# Patient Record
Sex: MALE | Race: WHITE | Employment: OTHER | URBAN - METROPOLITAN AREA
[De-identification: names, ages, dates, MRNs, and addresses within clinical notes are randomized per-mention and may not be internally consistent; named-entity substitution may affect disease eponyms.]

---

## 2017-11-13 ENCOUNTER — OFFICE VISIT (OUTPATIENT)
Dept: CARDIOLOGY | Age: 62
End: 2017-11-13
Payer: COMMERCIAL

## 2017-11-13 VITALS
DIASTOLIC BLOOD PRESSURE: 98 MMHG | HEART RATE: 73 BPM | WEIGHT: 200 LBS | SYSTOLIC BLOOD PRESSURE: 148 MMHG | OXYGEN SATURATION: 98 % | BODY MASS INDEX: 30.31 KG/M2 | HEIGHT: 68 IN | RESPIRATION RATE: 18 BRPM

## 2017-11-13 DIAGNOSIS — I48.20 CHRONIC ATRIAL FIBRILLATION (HCC): ICD-10-CM

## 2017-11-13 DIAGNOSIS — I42.8 NON-ISCHEMIC CARDIOMYOPATHY (HCC): ICD-10-CM

## 2017-11-13 DIAGNOSIS — I50.42 CHRONIC COMBINED SYSTOLIC AND DIASTOLIC CHF, NYHA CLASS 2 (HCC): Primary | ICD-10-CM

## 2017-11-13 DIAGNOSIS — I10 ESSENTIAL HYPERTENSION: ICD-10-CM

## 2017-11-13 PROCEDURE — 99214 OFFICE O/P EST MOD 30 MIN: CPT | Performed by: FAMILY MEDICINE

## 2017-11-13 PROCEDURE — 93000 ELECTROCARDIOGRAM COMPLETE: CPT | Performed by: FAMILY MEDICINE

## 2017-11-13 NOTE — PROGRESS NOTES
Patient: Darío Coombs  : 1955  Date of Visit: 2017    REASON FOR VISIT / CONSULTATION: 1 Year Follow Up (EKG done today. HX: Cardiomyopathy, Afib, CHF. Pt wt at last visit was 189 todays wt is 200. Pt states he is doing real good. Denies: CP, Palpitations, lightheaded/dizziness, SOB. )      Dear Ozzy Madden MD      I had the pleasure of seeing your patient Darío Coombs in consultation today. As you know, Mr. Rachael Salazar is a 58 y.o. male with a history of what sounds to be a non-ischemic cardiomyopathy back in  most likely secondary to a rate related cardiomyopathy due to atrial fibrillation. At that time his EF was apparently \"severely reduced\" but he is unsure of whether his heart ever got stronger or not. His EF in  was 35-40% on echocardiography. Since the last time I saw him,  Mr. Rachael Salazar reports having a pretty good exercise tolerance and denies any significant limitations in his activity. He denied any current or recent chest pain, abdominal pain, bleeding problems, problems with his medications or any other concerns at this time. Past Medical History:   Diagnosis Date    Atrial fibrillation ([address] 2008    Cardiomyopathy [facility]     Congestive heart failure, unspecified     Hyperlipidemia        CURRENT ALLERGIES: Nutritional supplements and Bee venom REVIEW OF SYSTEMS: 10 systems were reviewed. Pertinent positives and negatives as above, all else negative. Past Surgical History:   Procedure Laterality Date    ANKLE SURGERY      right ankle.  plate applied    COLONOSCOPY      TONSILLECTOMY      Social History:  Social History   Substance Use Topics    Smoking status: Former Smoker     Quit date: 1/10/1993    Smokeless tobacco: Never Used    Alcohol use No        CURRENT MEDICATIONS:  Outpatient Prescriptions Marked as Taking for the 17 encounter (Office Visit) with Mary Swenson MD   Medication Sig Dispense Refill    sacubitril-valsartan again, thank you for allowing me to participate in this patients care. Please do not hesitate to contact me could I be of further assistance. Sincerely,  Jovi Price. Judith LEOS, MS, F.A.C.C.   [facility] Cardiology Specialist  [address] [facility], [address] [place] [address]  Phone: [phone], Fax: [phone]

## 2017-11-18 ENCOUNTER — HOSPITAL ENCOUNTER (OUTPATIENT)
Age: 62
Discharge: HOME | End: 2017-11-18
Payer: COMMERCIAL

## 2017-11-18 DIAGNOSIS — I10 ESSENTIAL HYPERTENSION: ICD-10-CM

## 2017-11-18 DIAGNOSIS — I48.20 CHRONIC ATRIAL FIBRILLATION (HCC): ICD-10-CM

## 2017-11-18 LAB
ANION GAP SERPL CALCULATED.3IONS-SCNC: 13 MMOL/L (ref 9–17)
BUN BLDV-MCNC: 18 MG/DL (ref 8–23)
BUN/CREAT BLD: 19 (ref 9–20)
CALCIUM SERPL-MCNC: 9.3 MG/DL (ref 8.6–10.4)
CHLORIDE BLD-SCNC: 101 MMOL/L (ref 98–107)
CO2: 26 MMOL/L (ref 20–31)
CREAT SERPL-MCNC: 0.97 MG/DL (ref 0.7–1.2)
GFR AFRICAN AMERICAN: >60 ML/MIN
GFR NON-AFRICAN AMERICAN: >60 ML/MIN
GFR SERPL CREATININE-BSD FRML MDRD: ABNORMAL ML/MIN/{1.73_M2}
GFR SERPL CREATININE-BSD FRML MDRD: ABNORMAL ML/MIN/{1.73_M2}
GLUCOSE BLD-MCNC: 127 MG/DL (ref 70–99)
HCT VFR BLD CALC: 47.4 % (ref 41–53)
HEMOGLOBIN: 15.7 G/DL (ref 13.5–17)
MCH RBC QN AUTO: 28.8 PG (ref 26–34)
MCHC RBC AUTO-ENTMCNC: 33.1 G/DL (ref 31–37)
MCV RBC AUTO: 87.2 FL (ref 80–100)
PDW BLD-RTO: 13.5 % (ref 12.1–15.2)
PLATELET # BLD: 207 K/UL (ref 140–450)
PMV BLD AUTO: 10.2 FL (ref 6–12)
POTASSIUM SERPL-SCNC: 4 MMOL/L (ref 3.7–5.3)
RBC # BLD: 5.44 M/UL (ref 4.5–5.9)
SODIUM BLD-SCNC: 140 MMOL/L (ref 135–144)
WBC # BLD: 6.7 K/UL (ref 3.5–11)

## 2017-11-18 PROCEDURE — 80048 BASIC METABOLIC PNL TOTAL CA: CPT

## 2017-11-18 PROCEDURE — 85027 COMPLETE CBC AUTOMATED: CPT

## 2017-11-18 PROCEDURE — 36415 COLL VENOUS BLD VENIPUNCTURE: CPT

## 2018-04-09 ENCOUNTER — HOSPITAL ENCOUNTER (OUTPATIENT)
Age: 63
Discharge: HOME | End: 2018-04-09
Payer: COMMERCIAL

## 2018-04-09 DIAGNOSIS — Z12.5 ENCOUNTER FOR PROSTATE CANCER SCREENING: ICD-10-CM

## 2018-04-09 LAB — PROSTATE SPECIFIC ANTIGEN: 0.69 UG/L

## 2018-04-09 PROCEDURE — 36415 COLL VENOUS BLD VENIPUNCTURE: CPT

## 2018-04-09 PROCEDURE — G0103 PSA SCREENING: HCPCS

## 2018-04-17 ENCOUNTER — OFFICE VISIT (OUTPATIENT)
Dept: CARDIOLOGY | Age: 63
End: 2018-04-17
Payer: COMMERCIAL

## 2018-04-17 VITALS
RESPIRATION RATE: 18 BRPM | DIASTOLIC BLOOD PRESSURE: 95 MMHG | BODY MASS INDEX: 29.62 KG/M2 | OXYGEN SATURATION: 98 % | HEIGHT: 69 IN | SYSTOLIC BLOOD PRESSURE: 136 MMHG | HEART RATE: 78 BPM | WEIGHT: 200 LBS

## 2018-04-17 DIAGNOSIS — I48.20 CHRONIC ATRIAL FIBRILLATION (HCC): ICD-10-CM

## 2018-04-17 DIAGNOSIS — I42.9 IDIOPATHIC CARDIOMYOPATHY (HCC): ICD-10-CM

## 2018-04-17 DIAGNOSIS — I10 ESSENTIAL HYPERTENSION: ICD-10-CM

## 2018-04-17 DIAGNOSIS — E78.5 HYPERLIPIDEMIA, UNSPECIFIED HYPERLIPIDEMIA TYPE: ICD-10-CM

## 2018-04-17 DIAGNOSIS — I50.9 CONGESTIVE HEART FAILURE, UNSPECIFIED CONGESTIVE HEART FAILURE CHRONICITY, UNSPECIFIED CONGESTIVE HEART FAILURE TYPE: Primary | ICD-10-CM

## 2018-04-17 PROCEDURE — 99214 OFFICE O/P EST MOD 30 MIN: CPT | Performed by: FAMILY MEDICINE

## 2018-04-26 ENCOUNTER — HOSPITAL ENCOUNTER (OUTPATIENT)
Dept: NON INVASIVE DIAGNOSTICS | Age: 63
Discharge: HOME | End: 2018-04-26
Payer: COMMERCIAL

## 2018-04-26 DIAGNOSIS — I10 ESSENTIAL HYPERTENSION: ICD-10-CM

## 2018-04-26 DIAGNOSIS — I50.9 CONGESTIVE HEART FAILURE, UNSPECIFIED CONGESTIVE HEART FAILURE CHRONICITY, UNSPECIFIED CONGESTIVE HEART FAILURE TYPE: ICD-10-CM

## 2018-04-26 DIAGNOSIS — I48.20 CHRONIC ATRIAL FIBRILLATION (HCC): ICD-10-CM

## 2018-04-26 DIAGNOSIS — E78.5 HYPERLIPIDEMIA, UNSPECIFIED HYPERLIPIDEMIA TYPE: ICD-10-CM

## 2018-04-26 LAB
LV EF: 55 %
LVEF MODALITY: NORMAL

## 2018-04-26 PROCEDURE — 93306 TTE W/DOPPLER COMPLETE: CPT

## 2018-04-27 ENCOUNTER — TELEPHONE (OUTPATIENT)
Dept: CARDIOLOGY | Age: 63
End: 2018-04-27

## 2018-07-09 ENCOUNTER — HOSPITAL ENCOUNTER (OUTPATIENT)
Age: 63
Setting detail: SPECIMEN
Discharge: HOME | End: 2018-07-09
Payer: COMMERCIAL

## 2018-07-09 PROCEDURE — 86403 PARTICLE AGGLUT ANTBDY SCRN: CPT

## 2018-07-09 PROCEDURE — 87186 SC STD MICRODIL/AGAR DIL: CPT

## 2018-07-09 PROCEDURE — 87205 SMEAR GRAM STAIN: CPT

## 2018-07-09 PROCEDURE — 87070 CULTURE OTHR SPECIMN AEROBIC: CPT

## 2018-07-11 LAB
CULTURE: ABNORMAL
DIRECT EXAM: ABNORMAL
DIRECT EXAM: ABNORMAL
Lab: ABNORMAL
ORGANISM: ABNORMAL
SPECIMEN DESCRIPTION: ABNORMAL
STATUS: ABNORMAL

## 2018-07-17 ENCOUNTER — HOSPITAL ENCOUNTER (OUTPATIENT)
Dept: GENERAL RADIOLOGY | Age: 63
Discharge: HOME | End: 2018-07-19
Payer: COMMERCIAL

## 2018-07-17 ENCOUNTER — HOSPITAL ENCOUNTER (EMERGENCY)
Age: 63
Discharge: HOME | End: 2018-07-17
Attending: FAMILY MEDICINE
Payer: COMMERCIAL

## 2018-07-17 ENCOUNTER — HOSPITAL ENCOUNTER (OUTPATIENT)
Age: 63
Discharge: HOME | End: 2018-07-19
Payer: COMMERCIAL

## 2018-07-17 VITALS
SYSTOLIC BLOOD PRESSURE: 149 MMHG | OXYGEN SATURATION: 97 % | RESPIRATION RATE: 20 BRPM | HEART RATE: 78 BPM | DIASTOLIC BLOOD PRESSURE: 88 MMHG

## 2018-07-17 DIAGNOSIS — Z95.828 S/P PICC CENTRAL LINE PLACEMENT: Primary | ICD-10-CM

## 2018-07-17 DIAGNOSIS — Z45.2 PICC (PERIPHERALLY INSERTED CENTRAL CATHETER) FLUSH: ICD-10-CM

## 2018-07-17 DIAGNOSIS — Z79.899 MEDICATION THERAPY CONTINUED: ICD-10-CM

## 2018-07-17 PROCEDURE — 2580000003 HC RX 258: Performed by: FAMILY MEDICINE

## 2018-07-17 PROCEDURE — 71046 X-RAY EXAM CHEST 2 VIEWS: CPT

## 2018-07-17 PROCEDURE — 6360000002 HC RX W HCPCS: Performed by: FAMILY MEDICINE

## 2018-07-17 PROCEDURE — 99283 EMERGENCY DEPT VISIT LOW MDM: CPT

## 2018-07-17 PROCEDURE — 96374 THER/PROPH/DIAG INJ IV PUSH: CPT

## 2018-07-17 RX ORDER — CEFAZOLIN 1 G/1
2 INJECTION, POWDER, FOR SOLUTION INTRAVENOUS EVERY 8 HOURS
COMMUNITY
End: 2018-10-24 | Stop reason: ALTCHOICE

## 2018-07-17 RX ADMIN — WATER 2 G: 1 INJECTION INTRAMUSCULAR; INTRAVENOUS; SUBCUTANEOUS at 19:24

## 2018-07-17 NOTE — ED PROVIDER NOTES
tablet, R-11Normal             ALLERGIES     is allergic to nutritional supplements and bee venom. FAMILY HISTORY     indicated that his mother is alive. He indicated that his father is . He indicated that his sister is alive. He indicated that his brother is alive. family history includes Heart Disease in his mother; High Blood Pressure in his mother and sister; Other in his brother; Stroke in his mother. SOCIAL HISTORY      reports that he quit smoking about 25 years ago. He has never used smokeless tobacco. He reports that he does not drink alcohol or use drugs. PHYSICAL EXAM     INITIAL VITALS:  blood pressure is 149/88 (abnormal) and his pulse is 78. His respiration is 20 and oxygen saturation is 97%. Physical Exam   Constitutional: Patient is oriented to person, place, and time. Patient appears well-developed and well-nourished. Patient is active and cooperative. Neck: Full passive range of motion without pain and phonation normal.   Cardiovascular:  Normal rate, regular rhythm and intact distal pulses. PICC line noted right upper extremity  Pulses: Right radial pulse  2+   Pulmonary/Chest: Effort normal. No tachypnea and no bradypnea. Abdominal: Soft. Patient without distension   Musculoskeletal:   Negative acute trauma or deformity,  apparent full range of motion and normal strength all extremities appropriate to age. Neurological: Patient is alert and oriented to person, place, and time. patient displays no tremor. Patient displays no seizure activity. .     Skin: Skin is warm and dry. Patient is not diaphoretic. Psychiatric: Patient has a normal mood and pleasant affect.  Patient speech is normal and behavior is normal. Cognition and memory are normal.      DIFFERENTIAL DIAGNOSIS:   PICC line failure    DIAGNOSTIC RESULTS           RADIOLOGY: non-plain film images(s) such as CT, Ultrasound and MRI are read by the radiologist.  No orders to display       LABS:   Labs Reviewed - No data to display    EMERGENCY DEPARTMENT COURSE:   Vitals:    Vitals:    07/17/18 1912 07/17/18 1945   BP: (!) 150/105 (!) 149/88   Pulse: 78    Resp: 20    SpO2: 97%      RN Jose M Jamil had contacted Dr. Vince Angulo from [place], [place], regarding patient's PICC line difficulties,, physician at that facility does advise they will arranged at patient's PICC line evaluated, however does ask if we can give the patient his cefazolin IV dose in the emergency room    Discussed the patient nursing contact with his physician at [address] [place] [address], discussed plan for dose of cefazolin 2g IV  at this facility, patient go to [address] [place] [address] 5 next day for new PICC line, acknowledges            FINAL IMPRESSION      1. S/P PICC central line placement    2. Medication therapy continued          DISPOSITION/PLAN   Discharge    PATIENT REFERRED TO:  [address], MD  [address] [place] [address]  [phone]    Call   As needed      DISCHARGE MEDICATIONS:  Discharge Medication List as of 7/17/2018  7:37 PM              Summation      Patient Course:  Discharge    ED Medications administered this visit:    Medications   ceFAZolin (ANCEF) 2 g in sterile water 20 mL IV syringe (0 g Intravenous Stop Time 7/17/18 1935)       New Prescriptions from this visit:    Discharge Medication List as of 7/17/2018  7:37 PM          Follow-up:  [address], MD  [address] [place] [address]  [phone]    Call   As needed        Final Impression:   1. S/P PICC central line placement    2.  Medication therapy continued               (Please note that portions of this note were completed with a voice recognition program.  Efforts were made to edit the dictations but occasionally words are mis-transcribed.)    MD Brian Freeman MD  07/18/18 [id number]

## 2018-07-17 NOTE — ED NOTES
Dr. Anastacia Freitas called back and ask if patient could go to [facility] emergency department at 10 am and the PICC team will be able to replace his PICC. Patient verbalized understanding. Dr. Anastacia Freitas notified patient agreeable to be there.      Dano Mcnamara RN  07/17/18 3927

## 2018-07-23 ENCOUNTER — HOSPITAL ENCOUNTER (OUTPATIENT)
Age: 63
Setting detail: SPECIMEN
Discharge: HOME | End: 2018-07-23
Payer: COMMERCIAL

## 2018-07-23 LAB
ANION GAP SERPL CALCULATED.3IONS-SCNC: 11 MMOL/L (ref 9–17)
BUN BLDV-MCNC: 15 MG/DL (ref 8–23)
BUN/CREAT BLD: 18 (ref 9–20)
C-REACTIVE PROTEIN: 2 MG/L (ref 0–5)
CALCIUM SERPL-MCNC: 9.4 MG/DL (ref 8.6–10.4)
CHLORIDE BLD-SCNC: 100 MMOL/L (ref 98–107)
CO2: 29 MMOL/L (ref 20–31)
CREAT SERPL-MCNC: 0.82 MG/DL (ref 0.7–1.2)
GFR AFRICAN AMERICAN: >60 ML/MIN
GFR NON-AFRICAN AMERICAN: >60 ML/MIN
GFR SERPL CREATININE-BSD FRML MDRD: NORMAL ML/MIN/{1.73_M2}
GFR SERPL CREATININE-BSD FRML MDRD: NORMAL ML/MIN/{1.73_M2}
GLUCOSE BLD-MCNC: 94 MG/DL (ref 70–99)
HCT VFR BLD CALC: 40.2 % (ref 40.7–50.3)
HEMOGLOBIN: 13.6 G/DL (ref 13–17)
MCH RBC QN AUTO: 29.4 PG (ref 25.2–33.5)
MCHC RBC AUTO-ENTMCNC: 33.8 G/DL (ref 28.4–34.8)
MCV RBC AUTO: 87 FL (ref 82.6–102.9)
NRBC AUTOMATED: 0 PER 100 WBC
PDW BLD-RTO: 12.4 % (ref 11.8–14.4)
PLATELET # BLD: 177 K/UL (ref 138–453)
PMV BLD AUTO: 11.5 FL (ref 8.1–13.5)
POTASSIUM SERPL-SCNC: 4.2 MMOL/L (ref 3.7–5.3)
RBC # BLD: 4.62 M/UL (ref 4.21–5.77)
SEDIMENTATION RATE, ERYTHROCYTE: 5 MM (ref 0–20)
SODIUM BLD-SCNC: 140 MMOL/L (ref 135–144)
WBC # BLD: 5.5 K/UL (ref 3.5–11.3)

## 2018-07-23 PROCEDURE — 85651 RBC SED RATE NONAUTOMATED: CPT

## 2018-07-23 PROCEDURE — 86140 C-REACTIVE PROTEIN: CPT

## 2018-07-23 PROCEDURE — 85027 COMPLETE CBC AUTOMATED: CPT

## 2018-07-23 PROCEDURE — 80048 BASIC METABOLIC PNL TOTAL CA: CPT

## 2018-07-30 ENCOUNTER — HOSPITAL ENCOUNTER (OUTPATIENT)
Age: 63
Setting detail: SPECIMEN
Discharge: HOME | End: 2018-07-30
Payer: COMMERCIAL

## 2018-07-30 LAB
ANION GAP SERPL CALCULATED.3IONS-SCNC: 11 MMOL/L (ref 9–17)
BUN BLDV-MCNC: 14 MG/DL (ref 8–23)
BUN/CREAT BLD: 19 (ref 9–20)
C-REACTIVE PROTEIN: 2.7 MG/L (ref 0–5)
CALCIUM SERPL-MCNC: 9.1 MG/DL (ref 8.6–10.4)
CHLORIDE BLD-SCNC: 99 MMOL/L (ref 98–107)
CO2: 29 MMOL/L (ref 20–31)
CREAT SERPL-MCNC: 0.73 MG/DL (ref 0.7–1.2)
GFR AFRICAN AMERICAN: >60 ML/MIN
GFR NON-AFRICAN AMERICAN: >60 ML/MIN
GFR SERPL CREATININE-BSD FRML MDRD: ABNORMAL ML/MIN/{1.73_M2}
GFR SERPL CREATININE-BSD FRML MDRD: ABNORMAL ML/MIN/{1.73_M2}
GLUCOSE BLD-MCNC: 107 MG/DL (ref 70–99)
HCT VFR BLD CALC: 38.3 % (ref 40.7–50.3)
HEMOGLOBIN: 12.9 G/DL (ref 13–17)
MCH RBC QN AUTO: 29.3 PG (ref 25.2–33.5)
MCHC RBC AUTO-ENTMCNC: 33.7 G/DL (ref 28.4–34.8)
MCV RBC AUTO: 87 FL (ref 82.6–102.9)
NRBC AUTOMATED: 0 PER 100 WBC
PDW BLD-RTO: 12.3 % (ref 11.8–14.4)
PLATELET # BLD: 163 K/UL (ref 138–453)
PMV BLD AUTO: 11.6 FL (ref 8.1–13.5)
POTASSIUM SERPL-SCNC: 4.1 MMOL/L (ref 3.7–5.3)
RBC # BLD: 4.4 M/UL (ref 4.21–5.77)
SEDIMENTATION RATE, ERYTHROCYTE: 7 MM (ref 0–20)
SODIUM BLD-SCNC: 139 MMOL/L (ref 135–144)
WBC # BLD: 4.8 K/UL (ref 3.5–11.3)

## 2018-07-30 PROCEDURE — 85027 COMPLETE CBC AUTOMATED: CPT

## 2018-07-30 PROCEDURE — 86140 C-REACTIVE PROTEIN: CPT

## 2018-07-30 PROCEDURE — 85651 RBC SED RATE NONAUTOMATED: CPT

## 2018-07-30 PROCEDURE — 80048 BASIC METABOLIC PNL TOTAL CA: CPT

## 2018-08-06 ENCOUNTER — HOSPITAL ENCOUNTER (OUTPATIENT)
Age: 63
Setting detail: SPECIMEN
Discharge: HOME | End: 2018-08-06
Payer: COMMERCIAL

## 2018-08-06 LAB
ANION GAP SERPL CALCULATED.3IONS-SCNC: 8 MMOL/L (ref 9–17)
BUN BLDV-MCNC: 17 MG/DL (ref 8–23)
BUN/CREAT BLD: 22 (ref 9–20)
C-REACTIVE PROTEIN: 1.1 MG/L (ref 0–5)
CALCIUM SERPL-MCNC: 9.4 MG/DL (ref 8.6–10.4)
CHLORIDE BLD-SCNC: 98 MMOL/L (ref 98–107)
CO2: 31 MMOL/L (ref 20–31)
CREAT SERPL-MCNC: 0.77 MG/DL (ref 0.7–1.2)
GFR AFRICAN AMERICAN: >60 ML/MIN
GFR NON-AFRICAN AMERICAN: >60 ML/MIN
GFR SERPL CREATININE-BSD FRML MDRD: ABNORMAL ML/MIN/{1.73_M2}
GFR SERPL CREATININE-BSD FRML MDRD: ABNORMAL ML/MIN/{1.73_M2}
GLUCOSE BLD-MCNC: 103 MG/DL (ref 70–99)
HCT VFR BLD CALC: 41.1 % (ref 40.7–50.3)
HEMOGLOBIN: 13.8 G/DL (ref 13–17)
MCH RBC QN AUTO: 29.1 PG (ref 25.2–33.5)
MCHC RBC AUTO-ENTMCNC: 33.6 G/DL (ref 28.4–34.8)
MCV RBC AUTO: 86.5 FL (ref 82.6–102.9)
NRBC AUTOMATED: 0 PER 100 WBC
PDW BLD-RTO: 12.2 % (ref 11.8–14.4)
PLATELET # BLD: 189 K/UL (ref 138–453)
PMV BLD AUTO: 11.4 FL (ref 8.1–13.5)
POTASSIUM SERPL-SCNC: 4.2 MMOL/L (ref 3.7–5.3)
RBC # BLD: 4.75 M/UL (ref 4.21–5.77)
SEDIMENTATION RATE, ERYTHROCYTE: 6 MM (ref 0–20)
SODIUM BLD-SCNC: 137 MMOL/L (ref 135–144)
WBC # BLD: 5.1 K/UL (ref 3.5–11.3)

## 2018-08-06 PROCEDURE — 85027 COMPLETE CBC AUTOMATED: CPT

## 2018-08-06 PROCEDURE — 86140 C-REACTIVE PROTEIN: CPT

## 2018-08-06 PROCEDURE — 80048 BASIC METABOLIC PNL TOTAL CA: CPT

## 2018-08-06 PROCEDURE — 85651 RBC SED RATE NONAUTOMATED: CPT

## 2018-08-13 ENCOUNTER — HOSPITAL ENCOUNTER (OUTPATIENT)
Age: 63
Setting detail: SPECIMEN
Discharge: HOME | End: 2018-08-13
Payer: COMMERCIAL

## 2018-08-13 LAB
ANION GAP SERPL CALCULATED.3IONS-SCNC: 10 MMOL/L (ref 9–17)
BUN BLDV-MCNC: 17 MG/DL (ref 8–23)
BUN/CREAT BLD: 20 (ref 9–20)
C-REACTIVE PROTEIN: 1.1 MG/L (ref 0–5)
CALCIUM SERPL-MCNC: 9.3 MG/DL (ref 8.6–10.4)
CHLORIDE BLD-SCNC: 102 MMOL/L (ref 98–107)
CO2: 29 MMOL/L (ref 20–31)
CREAT SERPL-MCNC: 0.87 MG/DL (ref 0.7–1.2)
GFR AFRICAN AMERICAN: >60 ML/MIN
GFR NON-AFRICAN AMERICAN: >60 ML/MIN
GFR SERPL CREATININE-BSD FRML MDRD: NORMAL ML/MIN/{1.73_M2}
GFR SERPL CREATININE-BSD FRML MDRD: NORMAL ML/MIN/{1.73_M2}
GLUCOSE BLD-MCNC: 99 MG/DL (ref 70–99)
HCT VFR BLD CALC: 40.7 % (ref 40.7–50.3)
HEMOGLOBIN: 13.6 G/DL (ref 13–17)
MCH RBC QN AUTO: 29 PG (ref 25.2–33.5)
MCHC RBC AUTO-ENTMCNC: 33.4 G/DL (ref 28.4–34.8)
MCV RBC AUTO: 86.8 FL (ref 82.6–102.9)
NRBC AUTOMATED: 0 PER 100 WBC
PDW BLD-RTO: 12.4 % (ref 11.8–14.4)
PLATELET # BLD: 163 K/UL (ref 138–453)
PMV BLD AUTO: 11.8 FL (ref 8.1–13.5)
POTASSIUM SERPL-SCNC: 4.1 MMOL/L (ref 3.7–5.3)
RBC # BLD: 4.69 M/UL (ref 4.21–5.77)
SEDIMENTATION RATE, ERYTHROCYTE: 6 MM (ref 0–20)
SODIUM BLD-SCNC: 141 MMOL/L (ref 135–144)
WBC # BLD: 5.1 K/UL (ref 3.5–11.3)

## 2018-08-13 PROCEDURE — 85027 COMPLETE CBC AUTOMATED: CPT

## 2018-08-13 PROCEDURE — 80048 BASIC METABOLIC PNL TOTAL CA: CPT

## 2018-08-13 PROCEDURE — 85651 RBC SED RATE NONAUTOMATED: CPT

## 2018-08-13 PROCEDURE — 86140 C-REACTIVE PROTEIN: CPT

## 2018-08-20 ENCOUNTER — HOSPITAL ENCOUNTER (OUTPATIENT)
Age: 63
Setting detail: SPECIMEN
Discharge: HOME | End: 2018-08-20
Payer: COMMERCIAL

## 2018-08-20 LAB
ABSOLUTE EOS #: 0.17 K/UL (ref 0–0.44)
ABSOLUTE IMMATURE GRANULOCYTE: <0.03 K/UL (ref 0–0.3)
ABSOLUTE LYMPH #: 1.18 K/UL (ref 1.1–3.7)
ABSOLUTE MONO #: 0.52 K/UL (ref 0.1–1.2)
ANION GAP SERPL CALCULATED.3IONS-SCNC: 11 MMOL/L (ref 9–17)
BASOPHILS # BLD: 1 % (ref 0–2)
BASOPHILS ABSOLUTE: 0.05 K/UL (ref 0–0.2)
BUN BLDV-MCNC: 15 MG/DL (ref 8–23)
BUN/CREAT BLD: 17 (ref 9–20)
C-REACTIVE PROTEIN: 1.1 MG/L (ref 0–5)
CALCIUM SERPL-MCNC: 9.4 MG/DL (ref 8.6–10.4)
CHLORIDE BLD-SCNC: 101 MMOL/L (ref 98–107)
CO2: 30 MMOL/L (ref 20–31)
CREAT SERPL-MCNC: 0.88 MG/DL (ref 0.7–1.2)
DIFFERENTIAL TYPE: ABNORMAL
EOSINOPHILS RELATIVE PERCENT: 3 % (ref 1–4)
GFR AFRICAN AMERICAN: >60 ML/MIN
GFR NON-AFRICAN AMERICAN: >60 ML/MIN
GFR SERPL CREATININE-BSD FRML MDRD: ABNORMAL ML/MIN/{1.73_M2}
GFR SERPL CREATININE-BSD FRML MDRD: ABNORMAL ML/MIN/{1.73_M2}
GLUCOSE BLD-MCNC: 108 MG/DL (ref 70–99)
HCT VFR BLD CALC: 41 % (ref 40.7–50.3)
HEMOGLOBIN: 13.8 G/DL (ref 13–17)
IMMATURE GRANULOCYTES: 0 %
LYMPHOCYTES # BLD: 23 % (ref 24–43)
MCH RBC QN AUTO: 29.2 PG (ref 25.2–33.5)
MCHC RBC AUTO-ENTMCNC: 33.7 G/DL (ref 28.4–34.8)
MCV RBC AUTO: 86.7 FL (ref 82.6–102.9)
MONOCYTES # BLD: 10 % (ref 3–12)
NRBC AUTOMATED: 0 PER 100 WBC
PDW BLD-RTO: 12.3 % (ref 11.8–14.4)
PLATELET # BLD: 165 K/UL (ref 138–453)
PLATELET ESTIMATE: ABNORMAL
PMV BLD AUTO: 11.7 FL (ref 8.1–13.5)
POTASSIUM SERPL-SCNC: 4.5 MMOL/L (ref 3.7–5.3)
RBC # BLD: 4.73 M/UL (ref 4.21–5.77)
RBC # BLD: ABNORMAL 10*6/UL
SEDIMENTATION RATE, ERYTHROCYTE: 2 MM (ref 0–20)
SEG NEUTROPHILS: 63 % (ref 36–65)
SEGMENTED NEUTROPHILS ABSOLUTE COUNT: 3.16 K/UL (ref 1.5–8.1)
SODIUM BLD-SCNC: 142 MMOL/L (ref 135–144)
WBC # BLD: 5.1 K/UL (ref 3.5–11.3)
WBC # BLD: ABNORMAL 10*3/UL

## 2018-08-20 PROCEDURE — 85025 COMPLETE CBC W/AUTO DIFF WBC: CPT

## 2018-08-20 PROCEDURE — 80048 BASIC METABOLIC PNL TOTAL CA: CPT

## 2018-08-20 PROCEDURE — 85651 RBC SED RATE NONAUTOMATED: CPT

## 2018-08-20 PROCEDURE — 86140 C-REACTIVE PROTEIN: CPT

## 2018-10-24 ENCOUNTER — OFFICE VISIT (OUTPATIENT)
Dept: CARDIOLOGY | Age: 63
End: 2018-10-24
Payer: COMMERCIAL

## 2018-10-24 VITALS
SYSTOLIC BLOOD PRESSURE: 138 MMHG | BODY MASS INDEX: 29.77 KG/M2 | OXYGEN SATURATION: 98 % | RESPIRATION RATE: 16 BRPM | HEART RATE: 96 BPM | DIASTOLIC BLOOD PRESSURE: 89 MMHG | HEIGHT: 69 IN | WEIGHT: 201 LBS

## 2018-10-24 DIAGNOSIS — I10 ESSENTIAL HYPERTENSION: ICD-10-CM

## 2018-10-24 DIAGNOSIS — I48.20 CHRONIC A-FIB (HCC): ICD-10-CM

## 2018-10-24 DIAGNOSIS — E78.2 MIXED HYPERLIPIDEMIA: ICD-10-CM

## 2018-10-24 DIAGNOSIS — I50.42 CHRONIC COMBINED SYSTOLIC AND DIASTOLIC CHF, NYHA CLASS 2 (HCC): Primary | ICD-10-CM

## 2018-10-24 PROCEDURE — 93000 ELECTROCARDIOGRAM COMPLETE: CPT | Performed by: FAMILY MEDICINE

## 2018-10-24 PROCEDURE — 99214 OFFICE O/P EST MOD 30 MIN: CPT | Performed by: FAMILY MEDICINE

## 2018-10-24 RX ORDER — ATORVASTATIN CALCIUM 40 MG/1
40 TABLET, FILM COATED ORAL DAILY
Qty: 90 TABLET | Refills: 3 | Status: SHIPPED | OUTPATIENT
Start: 2018-10-24 | End: 2019-10-15 | Stop reason: SDUPTHER

## 2018-10-24 NOTE — PROGRESS NOTES
rubs. A I/VI systolic murmur was heard maximally at the 2nd right intercostal space. Pulmonary/Chest: Effort normal and breath sounds normal. No respiratory distress. He has no wheezes, rhonchi or rales. Abdominal: Soft, non-tender. Bowel sounds and aorta are normal. He exhibits no organomegaly, mass or bruit. Extremities:  No edema, cyanosis, or clubbing. Pulses are 2+ radial/carotid/dorsalis pedis and posterior tibial bilaterally. Neurological: He is alert and oriented to person, place, and time. No evidence of gross cranial nerve deficit. Coordination appeared normal.   Skin: Skin is warm and dry. There is no rash or diaphoresis. Psychiatric: He has a normal mood and affect. His speech is normal and behavior is normal.      MOST RECENT LABS ON RECORD:   Lab Results   Component Value Date    WBC 5.1 08/20/2018    HGB 13.8 08/20/2018    HCT 41.0 08/20/2018     08/20/2018    CHOL 144 06/03/2016    TRIG 83 06/03/2016    HDL 33 (L) 06/03/2016    ALT 19 06/03/2016    AST 17 06/03/2016     08/20/2018    K 4.5 08/20/2018     08/20/2018    CREATININE 0.88 08/20/2018    BUN 15 08/20/2018    CO2 30 08/20/2018    TSH 1.12 04/24/2015    PSA 0.69 04/09/2018    INR 1.2 01/10/2013       ASSESSMENT:  1. Chronic combined systolic and diastolic CHF, NYHA class 2 ([facility]    2. Chronic a-fib ([facility] [address]    3. Essential hypertension    4. Mixed hyperlipidemia       PLAN:  Chronic Systolic and Diastolic Heart Failure: EF improved from 35% up to 55% on 4/26/2018 via echocardiogram  · Beta Blocker Therapy: Continue Carvedilol 25 mg twice daily. ACE Inibitor/ARB: Continue sacubitril/valsartan (Entresto) 49/51 mg every 12 hours    ·  Nonpharmacologic management of Heart Failure:  I advised him to try and keep his legs up whenever possible and to limit salt in his diet.          · Chronic Atrial Fibrillation: Rate Control  · Beta Blocker Therapy: Continue Carvedilol     · His CHADS2 score is greater than 2(2.2% stroke

## 2018-11-23 ENCOUNTER — HOSPITAL ENCOUNTER (OUTPATIENT)
Age: 63
Discharge: HOME | End: 2018-11-23
Payer: COMMERCIAL

## 2018-11-23 DIAGNOSIS — I48.20 CHRONIC A-FIB (HCC): ICD-10-CM

## 2018-11-23 DIAGNOSIS — I50.42 CHRONIC COMBINED SYSTOLIC AND DIASTOLIC CHF, NYHA CLASS 2 (HCC): ICD-10-CM

## 2018-11-23 DIAGNOSIS — I10 ESSENTIAL HYPERTENSION: ICD-10-CM

## 2018-11-23 DIAGNOSIS — E78.2 MIXED HYPERLIPIDEMIA: ICD-10-CM

## 2018-11-23 LAB
CHOLESTEROL/HDL RATIO: 3.7
CHOLESTEROL: 114 MG/DL
HDLC SERPL-MCNC: 31 MG/DL
LDL CHOLESTEROL: 63 MG/DL (ref 0–130)
TRIGL SERPL-MCNC: 102 MG/DL
VLDLC SERPL CALC-MCNC: ABNORMAL MG/DL (ref 1–30)

## 2018-11-23 PROCEDURE — 80061 LIPID PANEL: CPT

## 2018-11-23 PROCEDURE — 36415 COLL VENOUS BLD VENIPUNCTURE: CPT

## 2018-11-26 ENCOUNTER — TELEPHONE (OUTPATIENT)
Dept: CARDIOLOGY | Age: 63
End: 2018-11-26

## 2019-10-15 DIAGNOSIS — I50.42 CHRONIC COMBINED SYSTOLIC AND DIASTOLIC CHF, NYHA CLASS 2 (HCC): ICD-10-CM

## 2019-10-15 DIAGNOSIS — I48.20 CHRONIC A-FIB (HCC): ICD-10-CM

## 2019-10-15 DIAGNOSIS — I10 ESSENTIAL HYPERTENSION: ICD-10-CM

## 2019-10-15 DIAGNOSIS — E78.2 MIXED HYPERLIPIDEMIA: ICD-10-CM

## 2019-10-15 RX ORDER — ATORVASTATIN CALCIUM 40 MG/1
40 TABLET, FILM COATED ORAL DAILY
Qty: 90 TABLET | Refills: 3 | Status: SHIPPED | OUTPATIENT
Start: 2019-10-15 | End: 2019-10-28 | Stop reason: SDUPTHER

## 2019-10-28 ENCOUNTER — OFFICE VISIT (OUTPATIENT)
Dept: CARDIOLOGY | Age: 64
End: 2019-10-28

## 2019-10-28 VITALS
HEART RATE: 95 BPM | SYSTOLIC BLOOD PRESSURE: 118 MMHG | HEIGHT: 69 IN | DIASTOLIC BLOOD PRESSURE: 80 MMHG | BODY MASS INDEX: 30.21 KG/M2 | RESPIRATION RATE: 18 BRPM | WEIGHT: 204 LBS | OXYGEN SATURATION: 96 %

## 2019-10-28 DIAGNOSIS — I10 ESSENTIAL HYPERTENSION: ICD-10-CM

## 2019-10-28 DIAGNOSIS — I50.42 CHRONIC COMBINED SYSTOLIC AND DIASTOLIC CHF, NYHA CLASS 2 (HCC): Primary | ICD-10-CM

## 2019-10-28 DIAGNOSIS — I48.20 CHRONIC ATRIAL FIBRILLATION (HCC): ICD-10-CM

## 2019-10-28 DIAGNOSIS — I48.20 CHRONIC A-FIB (HCC): ICD-10-CM

## 2019-10-28 DIAGNOSIS — I42.9 IDIOPATHIC CARDIOMYOPATHY (HCC): ICD-10-CM

## 2019-10-28 DIAGNOSIS — E78.2 MIXED HYPERLIPIDEMIA: ICD-10-CM

## 2019-10-28 PROCEDURE — 99213 OFFICE O/P EST LOW 20 MIN: CPT | Performed by: FAMILY MEDICINE

## 2019-10-28 PROCEDURE — 93000 ELECTROCARDIOGRAM COMPLETE: CPT | Performed by: FAMILY MEDICINE

## 2019-10-28 RX ORDER — ATORVASTATIN CALCIUM 40 MG/1
40 TABLET, FILM COATED ORAL DAILY
Qty: 90 TABLET | Refills: 3 | Status: SHIPPED | OUTPATIENT
Start: 2019-10-28 | End: 2020-07-30 | Stop reason: SDUPTHER

## 2019-12-09 ENCOUNTER — HOSPITAL ENCOUNTER (OUTPATIENT)
Age: 64
Discharge: HOME | End: 2019-12-09
Payer: COMMERCIAL

## 2019-12-09 DIAGNOSIS — I10 ESSENTIAL HYPERTENSION: ICD-10-CM

## 2019-12-09 DIAGNOSIS — Z12.5 ENCOUNTER FOR PROSTATE CANCER SCREENING: ICD-10-CM

## 2019-12-10 ENCOUNTER — HOSPITAL ENCOUNTER (OUTPATIENT)
Age: 64
Discharge: HOME | End: 2019-12-10
Payer: COMMERCIAL

## 2019-12-10 DIAGNOSIS — I10 ESSENTIAL HYPERTENSION: ICD-10-CM

## 2019-12-10 DIAGNOSIS — Z12.5 SPECIAL SCREENING FOR MALIGNANT NEOPLASM OF PROSTATE: ICD-10-CM

## 2019-12-10 DIAGNOSIS — E78.2 MIXED HYPERLIPIDEMIA: ICD-10-CM

## 2019-12-10 LAB
ABSOLUTE EOS #: 0.21 K/UL (ref 0–0.44)
ABSOLUTE IMMATURE GRANULOCYTE: <0.03 K/UL (ref 0–0.3)
ABSOLUTE LYMPH #: 1.63 K/UL (ref 1.1–3.7)
ABSOLUTE MONO #: 0.51 K/UL (ref 0.1–1.2)
ALBUMIN SERPL-MCNC: 4.4 G/DL (ref 3.5–5.2)
ALBUMIN/GLOBULIN RATIO: 1.7 (ref 1–2.5)
ALP BLD-CCNC: 101 U/L (ref 40–129)
ALT SERPL-CCNC: 20 U/L (ref 5–41)
ANION GAP SERPL CALCULATED.3IONS-SCNC: 8 MMOL/L (ref 9–17)
AST SERPL-CCNC: 16 U/L
BASOPHILS # BLD: 1 % (ref 0–2)
BASOPHILS ABSOLUTE: 0.07 K/UL (ref 0–0.2)
BILIRUB SERPL-MCNC: 0.79 MG/DL (ref 0.3–1.2)
BUN BLDV-MCNC: 14 MG/DL (ref 8–23)
BUN/CREAT BLD: 14 (ref 9–20)
CALCIUM SERPL-MCNC: 9.4 MG/DL (ref 8.6–10.4)
CHLORIDE BLD-SCNC: 99 MMOL/L (ref 98–107)
CHOLESTEROL/HDL RATIO: 3.8
CHOLESTEROL: 127 MG/DL
CO2: 31 MMOL/L (ref 20–31)
CREAT SERPL-MCNC: 1.02 MG/DL (ref 0.7–1.2)
DIFFERENTIAL TYPE: NORMAL
EOSINOPHILS RELATIVE PERCENT: 3 % (ref 1–4)
GFR AFRICAN AMERICAN: >60 ML/MIN
GFR NON-AFRICAN AMERICAN: >60 ML/MIN
GFR SERPL CREATININE-BSD FRML MDRD: ABNORMAL ML/MIN/{1.73_M2}
GFR SERPL CREATININE-BSD FRML MDRD: ABNORMAL ML/MIN/{1.73_M2}
GLUCOSE BLD-MCNC: 113 MG/DL (ref 70–99)
HCT VFR BLD CALC: 46.6 % (ref 40.7–50.3)
HDLC SERPL-MCNC: 33 MG/DL
HEMOGLOBIN: 15.1 G/DL (ref 13–17)
IMMATURE GRANULOCYTES: 0 %
LDL CHOLESTEROL: 72 MG/DL (ref 0–130)
LYMPHOCYTES # BLD: 26 % (ref 24–43)
MCH RBC QN AUTO: 28.8 PG (ref 25.2–33.5)
MCHC RBC AUTO-ENTMCNC: 32.4 G/DL (ref 28.4–34.8)
MCV RBC AUTO: 88.9 FL (ref 82.6–102.9)
MONOCYTES # BLD: 8 % (ref 3–12)
NRBC AUTOMATED: 0 PER 100 WBC
PDW BLD-RTO: 12.5 % (ref 11.8–14.4)
PLATELET # BLD: 175 K/UL (ref 138–453)
PLATELET ESTIMATE: NORMAL
PMV BLD AUTO: 11.5 FL (ref 8.1–13.5)
POTASSIUM SERPL-SCNC: 4.2 MMOL/L (ref 3.7–5.3)
PROSTATE SPECIFIC ANTIGEN: 0.78 UG/L
RBC # BLD: 5.24 M/UL (ref 4.21–5.77)
RBC # BLD: NORMAL 10*6/UL
SEG NEUTROPHILS: 62 % (ref 36–65)
SEGMENTED NEUTROPHILS ABSOLUTE COUNT: 3.75 K/UL (ref 1.5–8.1)
SODIUM BLD-SCNC: 138 MMOL/L (ref 135–144)
TOTAL PROTEIN: 7 G/DL (ref 6.4–8.3)
TRIGL SERPL-MCNC: 109 MG/DL
VLDLC SERPL CALC-MCNC: ABNORMAL MG/DL (ref 1–30)
WBC # BLD: 6.2 K/UL (ref 3.5–11.3)
WBC # BLD: NORMAL 10*3/UL

## 2019-12-10 PROCEDURE — 36415 COLL VENOUS BLD VENIPUNCTURE: CPT

## 2019-12-10 PROCEDURE — 85025 COMPLETE CBC W/AUTO DIFF WBC: CPT

## 2019-12-10 PROCEDURE — G0103 PSA SCREENING: HCPCS

## 2019-12-10 PROCEDURE — 80053 COMPREHEN METABOLIC PANEL: CPT

## 2019-12-10 PROCEDURE — 80061 LIPID PANEL: CPT

## 2020-10-22 ENCOUNTER — OFFICE VISIT (OUTPATIENT)
Dept: CARDIOLOGY | Age: 65
End: 2020-10-22
Payer: MEDICARE

## 2020-10-22 VITALS
HEIGHT: 69 IN | RESPIRATION RATE: 18 BRPM | OXYGEN SATURATION: 96 % | BODY MASS INDEX: 30.07 KG/M2 | DIASTOLIC BLOOD PRESSURE: 71 MMHG | SYSTOLIC BLOOD PRESSURE: 120 MMHG | HEART RATE: 93 BPM | WEIGHT: 203 LBS

## 2020-10-22 PROCEDURE — 99214 OFFICE O/P EST MOD 30 MIN: CPT | Performed by: FAMILY MEDICINE

## 2020-10-22 PROCEDURE — 99211 OFF/OP EST MAY X REQ PHY/QHP: CPT | Performed by: FAMILY MEDICINE

## 2020-10-22 PROCEDURE — 93010 ELECTROCARDIOGRAM REPORT: CPT | Performed by: FAMILY MEDICINE

## 2020-10-22 PROCEDURE — 93005 ELECTROCARDIOGRAM TRACING: CPT | Performed by: FAMILY MEDICINE

## 2020-10-22 NOTE — PROGRESS NOTES
Jimmie Hammond am scribing for and in the presence of Trena Wong MD, MS, F.A.C.C. Patient: Isabela Torres  : 1955  Date of Visit: 2020    REASON FOR VISIT / CONSULTATION: 1 Year Follow Up (HX: CHF, Idiopathic Cardiomyopathy, Chronic A-Fib, HTN, HLD. Pt states he is doing good. Denies: CP, Palpitations, Lighteaded/dizziness, SOB. )      Dear Shayla Hair MD,     I had the pleasure of seeing your patient Isabela Torres in consultation today. As you know, Mr. Leandro Starr is a 72 y.o. male with a history of what sounds to be a non-ischemic cardiomyopathy back in  most likely secondary to a rate related cardiomyopathy due to atrial fibrillation. At that same time, he says he thinks he was diagnosed with blood clots in his lungs but denies any known issues or repeat problems since then. At that time his EF was apparently \"severely reduced\" but he is unsure of whether his heart ever got stronger or not. His EF in  was 35-40% on echocardiography. In , he was started on Entresto for his non ischemic cardiomyopathy. Echocardiogram done on 2018 showed an improved ejection fraction from 35-40% up to >55%. Since the last time I saw him, Mr. Leandro Starr reports doing very well with no complaints today. He denied any current or recent chest pain, abdominal pain, bleeding problems, problems with his medications or any other concerns at this time. Bleeding Risks: Mr. Leandro Starr denies any current or recent bleeding problems including a history of a GI bleed, ulcers, recent or upcoming surgeries, blood in his stool or black tarry stools or blood in his urine. Exercise Tolerance: Mr. Leandro Starr reports that he has an excellent exercise tolerance. His says that he could walk 1 mile without developing chest discomfort or significant shortness of breath.      Past Medical History:   Diagnosis Date    Atrial fibrillation ([address]     Cardiomyopathy [facility])     Congestive heart failure, unspecified     History of echocardiogram 2018    EF >55%. LV wall thickness is moderately increased. LA is moderately dilated (34-39) w/ LA volume index of 34. Mild mitral regurg. Mild tricuspid regurg. Diastology cannot be assessed to pt rhythm of what appears to be afib,    Hyperlipidemia        CURRENT ALLERGIES: Nutritional supplements and Bee venom REVIEW OF SYSTEMS: 14 systems were reviewed. Pertinent positives and negatives as above, all else negative. Past Surgical History:   Procedure Laterality Date    ANKLE SURGERY      right ankle. plate applied    COLONOSCOPY  2011    TONSILLECTOMY      Social History:  Social History     Tobacco Use    Smoking status: Former Smoker     Last attempt to quit: 1/10/1993     Years since quittin.8    Smokeless tobacco: Never Used   Substance Use Topics    Alcohol use: No    Drug use: No        CURRENT MEDICATIONS:  Outpatient Medications Marked as Taking for the 10/22/20 encounter (Office Visit) with Renee Quan MD   Medication Sig Dispense Refill    carvedilol (COREG) 25 MG tablet Take 1 tablet by mouth 2 times daily 180 tablet 0    atorvastatin (LIPITOR) 40 MG tablet Take 1 tablet by mouth daily 90 tablet 3    apixaban (ELIQUIS) 5 MG TABS tablet Take 1 tablet by mouth 2 times daily 180 tablet 0    sacubitril-valsartan (ENTRESTO) 49-51 MG per tablet Take 1 tablet by mouth 2 times daily 180 tablet 3       FAMILY HISTORY: family history includes Heart Disease in his mother; High Blood Pressure in his mother and sister; Other in his brother; Stroke in his mother. PHYSICAL EXAM:   /71 (Site: Left Upper Arm, Position: Sitting, Cuff Size: Medium Adult)   Pulse 93   Resp 18   Ht 5' 9\" (1.753 m)   Wt 203 lb (92.1 kg)   SpO2 96%   BMI 29.98 kg/m²  Body mass index is 29.98 kg/m². Constitutional: He is oriented to person, place, and time. He appears well-developed and well-nourished. In no acute distress.    HEENT: Normocephalic and atraumatic. No JVD present. Carotid bruit is not present. No mass and no thyromegaly present. No lymphadenopathy present. Cardiovascular: Normal rate, irregularly irregular rhythm, normal heart sounds. Exam reveals no gallop and no friction rubs. A II/VI systolic was heard maximally at the 2nd right intercostal space. Pulmonary/Chest: Effort normal and breath sounds normal. No respiratory distress. He has no wheezes, rhonchi or rales. Abdominal: Soft, non-tender. Bowel sounds and aorta are normal. He exhibits no organomegaly, mass or bruit. Extremities:  No edema, cyanosis, or clubbing. Pulses are 2+ radial/carotid/dorsalis pedis and posterior tibial bilaterally. Neurological: He is alert and oriented to person, place, and time. No evidence of gross cranial nerve deficit. Coordination appeared normal.   Skin: Skin is warm and dry. There is no rash or diaphoresis. Psychiatric: He has a normal mood and affect. His speech is normal and behavior is normal.      MOST RECENT LABS ON RECORD:   Lab Results   Component Value Date    WBC 6.2 12/10/2019    HGB 15.1 12/10/2019    HCT 46.6 12/10/2019     12/10/2019    CHOL 127 12/10/2019    TRIG 109 12/10/2019    HDL 33 (L) 12/10/2019    ALT 20 12/10/2019    AST 16 12/10/2019     12/10/2019    K 4.2 12/10/2019    CL 99 12/10/2019    CREATININE 1.02 12/10/2019    BUN 14 12/10/2019    CO2 31 12/10/2019    TSH 1.12 04/24/2015    PSA 0.78 12/10/2019    INR 1.2 01/10/2013    LABA1C 5.4 02/11/2020       ASSESSMENT:  1. Chronic diastolic congestive heart failure ([address]    2. Idiopathic cardiomyopathy ([address]    3. Chronic atrial fibrillation ([address]    4. Essential hypertension    5.  Mixed hyperlipidemia       PLAN:  · Chronic Diastolic Heart Failure/ History of a Non Ischemic Cardiomyopathy, EF improved from 35% up to 55% on 4/26/2018 via echocardiogram. Currently appears very well without active signs of heart failure  · Beta Blocker Therapy: Continue Carvedilol 25 mg continue to take his current medications and follow up with you as previously scheduled. FOLLOW UP:   I told Mr. Hansen to call my office if he had any problems, but otherwise told him to Return in about 1 year (around 10/22/2021). However, I would be happy to see him sooner should the need arise. Once again, thank you for allowing me to participate in this patients care. Sincerely,  Areli Cherry. Judith LEOS, MS, F.A.C.C. [facility] Cardiology Specialist  [address] [place] [address]  Phone: [phone], Fax: [phone]     I believe that the risk of significant morbidity and mortality related to the patient's current medical conditions are: low-intermediate. The documentation recorded by the scribe, accurately and completely reflects the services I personally performed and the decisions made by me. Abilio Wong MD, MS, F.A.C.C.  October 22, 2020

## 2020-10-22 NOTE — PATIENT INSTRUCTIONS
SURVEY:    You may be receiving a survey from [email or web address] regarding your visit today. Please complete the survey to enable us to provide the highest quality of care to you and your family. If you cannot score us a very good on any question, please call the office to discuss how we could have made your experience a very good one. Thank you. Rashad Méndez was your MA today!

## 2020-10-27 ENCOUNTER — HOSPITAL ENCOUNTER (OUTPATIENT)
Age: 65
Discharge: HOME | End: 2020-10-27
Payer: MEDICARE

## 2020-10-27 LAB
ALBUMIN SERPL-MCNC: 4.3 G/DL (ref 3.5–5.2)
ALBUMIN/GLOBULIN RATIO: 2.2 (ref 1–2.5)
ALP BLD-CCNC: 93 U/L (ref 40–129)
ALT SERPL-CCNC: 20 U/L (ref 5–41)
ANION GAP SERPL CALCULATED.3IONS-SCNC: 7 MMOL/L (ref 9–17)
AST SERPL-CCNC: 15 U/L
BILIRUB SERPL-MCNC: 0.82 MG/DL (ref 0.3–1.2)
BUN BLDV-MCNC: 16 MG/DL (ref 8–23)
BUN/CREAT BLD: 16 (ref 9–20)
CALCIUM SERPL-MCNC: 9.4 MG/DL (ref 8.6–10.4)
CHLORIDE BLD-SCNC: 101 MMOL/L (ref 98–107)
CO2: 30 MMOL/L (ref 20–31)
CREAT SERPL-MCNC: 0.97 MG/DL (ref 0.7–1.2)
GFR AFRICAN AMERICAN: >60 ML/MIN
GFR NON-AFRICAN AMERICAN: >60 ML/MIN
GFR SERPL CREATININE-BSD FRML MDRD: ABNORMAL ML/MIN/{1.73_M2}
GFR SERPL CREATININE-BSD FRML MDRD: ABNORMAL ML/MIN/{1.73_M2}
GLUCOSE BLD-MCNC: 110 MG/DL (ref 70–99)
HCT VFR BLD CALC: 42.7 % (ref 40.7–50.3)
HEMOGLOBIN: 14.1 G/DL (ref 13–17)
MCH RBC QN AUTO: 29.2 PG (ref 25.2–33.5)
MCHC RBC AUTO-ENTMCNC: 33 G/DL (ref 28.4–34.8)
MCV RBC AUTO: 88.4 FL (ref 82.6–102.9)
NRBC AUTOMATED: 0 PER 100 WBC
PDW BLD-RTO: 12.7 % (ref 11.8–14.4)
PLATELET # BLD: 171 K/UL (ref 138–453)
PMV BLD AUTO: 11.8 FL (ref 8.1–13.5)
POTASSIUM SERPL-SCNC: 4.1 MMOL/L (ref 3.7–5.3)
RBC # BLD: 4.83 M/UL (ref 4.21–5.77)
SODIUM BLD-SCNC: 138 MMOL/L (ref 135–144)
TOTAL PROTEIN: 6.3 G/DL (ref 6.4–8.3)
WBC # BLD: 7 K/UL (ref 3.5–11.3)

## 2020-10-27 PROCEDURE — 85027 COMPLETE CBC AUTOMATED: CPT

## 2020-10-27 PROCEDURE — 36415 COLL VENOUS BLD VENIPUNCTURE: CPT

## 2020-10-27 PROCEDURE — 80053 COMPREHEN METABOLIC PANEL: CPT

## 2020-10-28 ENCOUNTER — TELEPHONE (OUTPATIENT)
Dept: CARDIOLOGY | Age: 65
End: 2020-10-28

## 2020-10-28 NOTE — TELEPHONE ENCOUNTER
----- Message from Tiffany Kelly MD sent at 10/27/2020 11:18 PM EDT -----  Let Mr. Hansen know their test result was ok. Thanks.

## 2020-11-04 RX ORDER — SACUBITRIL AND VALSARTAN 49; 51 MG/1; MG/1
1 TABLET, FILM COATED ORAL 2 TIMES DAILY
Qty: 180 TABLET | Refills: 3 | Status: SHIPPED | OUTPATIENT
Start: 2020-11-04 | End: 2021-11-16 | Stop reason: SDUPTHER

## 2020-12-29 RX ORDER — ATORVASTATIN CALCIUM 40 MG/1
40 TABLET, FILM COATED ORAL DAILY
Qty: 90 TABLET | Refills: 3 | Status: SHIPPED | OUTPATIENT
Start: 2020-12-29 | End: 2022-01-06 | Stop reason: SDUPTHER

## 2021-02-05 ENCOUNTER — HOSPITAL ENCOUNTER (OUTPATIENT)
Dept: ULTRASOUND IMAGING | Age: 66
Discharge: HOME | End: 2021-02-07
Payer: MEDICARE

## 2021-02-05 ENCOUNTER — HOSPITAL ENCOUNTER (OUTPATIENT)
Age: 66
Discharge: HOME | End: 2021-02-05
Payer: MEDICARE

## 2021-02-05 DIAGNOSIS — Z00.00 ENCOUNTER FOR MEDICARE ANNUAL WELLNESS EXAM: ICD-10-CM

## 2021-02-05 DIAGNOSIS — Z13.6 SCREENING FOR AAA (AORTIC ABDOMINAL ANEURYSM): ICD-10-CM

## 2021-02-05 PROCEDURE — 80061 LIPID PANEL: CPT

## 2021-02-05 PROCEDURE — 76706 US ABDL AORTA SCREEN AAA: CPT

## 2021-02-05 PROCEDURE — 36415 COLL VENOUS BLD VENIPUNCTURE: CPT

## 2021-02-06 LAB
CHOLESTEROL/HDL RATIO: 3.6
CHOLESTEROL: 112 MG/DL
HDLC SERPL-MCNC: 31 MG/DL
LDL CHOLESTEROL: 63 MG/DL (ref 0–130)
TRIGL SERPL-MCNC: 89 MG/DL
VLDLC SERPL CALC-MCNC: ABNORMAL MG/DL (ref 1–30)

## 2021-03-09 ENCOUNTER — OFFICE VISIT (OUTPATIENT)
Dept: SURGERY | Age: 66
End: 2021-03-09
Payer: MEDICARE

## 2021-03-09 VITALS
RESPIRATION RATE: 18 BRPM | HEART RATE: 90 BPM | BODY MASS INDEX: 30.51 KG/M2 | TEMPERATURE: 97.7 F | HEIGHT: 69 IN | DIASTOLIC BLOOD PRESSURE: 83 MMHG | SYSTOLIC BLOOD PRESSURE: 120 MMHG | WEIGHT: 206 LBS

## 2021-03-09 DIAGNOSIS — Z01.818 PRE-OP TESTING: ICD-10-CM

## 2021-03-09 DIAGNOSIS — R19.5 POSITIVE FIT (FECAL IMMUNOCHEMICAL TEST): Primary | ICD-10-CM

## 2021-03-09 DIAGNOSIS — Z86.79 HISTORY OF ATRIAL FIBRILLATION: ICD-10-CM

## 2021-03-09 PROCEDURE — 99202 OFFICE O/P NEW SF 15 MIN: CPT | Performed by: SURGERY

## 2021-03-09 ASSESSMENT — ENCOUNTER SYMPTOMS
NAUSEA: 0
COUGH: 0
VOMITING: 0
TROUBLE SWALLOWING: 0
SHORTNESS OF BREATH: 0
BACK PAIN: 0
BLOOD IN STOOL: 0
ABDOMINAL PAIN: 0
SORE THROAT: 0
CHOKING: 0

## 2021-03-09 NOTE — PATIENT INSTRUCTIONS
Patient Education        Learning About Colonoscopy  What is a colonoscopy? A colonoscopy is a test (also called a procedure) that lets a doctor look inside your large intestine. The doctor uses a thin, lighted tube called a colonoscope. The doctor uses it to look for small growths called polyps, colon or rectal cancer (colorectal cancer), or other problems like bleeding. During the procedure, the doctor can take samples of tissue. The samples can then be checked for cancer or other conditions. The doctor can also take out polyps. How is a colonoscopy done? This procedure is done in a doctor's office or a clinic or hospital. You will get medicine to help you relax and not feel pain. Some people find that they don't remember having the test because of the medicine. The doctor gently moves the colonoscope, or scope, through the colon. The scope is also a small video camera. It lets the doctor see the colon and take pictures. How do you prepare for the procedure? You need to clean out your colon before the procedure so the doctor can see all of your colon. This process may start a day or two before the test. This depends on which \"colon prep\" your doctor recommends. To clean your colon, you stop eating solid foods and drink only clear liquids. You can have water, tea, coffee, clear juices, clear broths, flavored ice pops, and gelatin (such as Jell-O). Do not drink anything red or purple. The day or night before the procedure, you drink a large amount of a special liquid. This causes loose, frequent stools. You will go to the bathroom a lot. It's very important to drink all of the liquid. If you have problems drinking it, call your doctor. Some people don't go to work or do their usual activities on the day of the prep. Arrange to have someone take you home after the test.  What can you expect after a colonoscopy? Your doctor will tell you when you can eat and do your usual activities.   Drink a lot of fluid after the test to replace the fluids you may have lost during the colon prep. But don't drink alcohol. Your doctor will talk to you about when you'll need your next colonoscopy. The results of your test and your risk for colorectal cancer will help your doctor decide how often you need to be checked. After the test, you may be bloated or have gas pains. You may need to pass gas. If a biopsy was done or a polyp was removed, you may have streaks of blood in your stool (feces) for a few days. If polyps were taken out, your doctor may tell you to avoid taking aspirin and nonsteroidal anti-inflammatory drugs (NSAIDs) for 7 to 14 days. Problems such as heavy rectal bleeding may not occur until several weeks after the test. This isn't common. But it can happen after polyps are removed. Follow-up care is a key part of your treatment and safety. Be sure to make and go to all appointments, and call your doctor if you are having problems. It's also a good idea to know your test results and keep a list of the medicines you take. Where can you learn more? Go to https://[employer]. org and sign in to your [employer] account. Enter G867 in the [employer] box to learn more about \"Learning About Colonoscopy. \"     If you do not have an account, please click on the \"Sign Up Now\" link. Current as of: April 29, 2020               Content Version: 12.6  © 8325-5625 [employer]. Care instructions adapted under license by [facility] ([facility]). If you have questions about a medical condition or this instruction, always ask your healthcare professional. [address] any warranty or liability for your use of this information.

## 2021-03-09 NOTE — PROGRESS NOTES
Olya Cueto MD  General Surgery, Endoscopy  Chief Medical Officer    [id number] [address]  Dept: [phone]  Fax: 15 Keturah Atkins  Chief Complaint   Patient presents with    New Patient    Colonoscopy     Patient referred by Dr Enrique Mccullough for colonoscopy consult due to positive fecal immunochemical test. Patient denies any symptoms. Previous colonoscopy 2011. No known history of COVID-19. Received first dose of vaccination, second is scheduled. HPI    Mr. Preston Smith is a pleasant 51-year-old white male kindly referred to me by Dr. Enrique Mccullough evaluation of positive FIT. He has no GI complaints. No abdominal pain. No epigastric pain nor reflux symptoms. Normal daily bowel movements, formed and brown, no visible blood. Normal appetite. No recent weight changes, 206 pounds, BMI 30. Colonoscopy proximately 10 years ago was normal, without polyps, as patient recalls. No cough, fever nor other respiratory symptoms. No history of COVID-19, received first vaccination, booster scheduled. History of A. fib and cardiomyopathy with CHF, currently taking Eliquis. Annual visits with Dr. Huffman Person, most recently 5 months ago. No previous abdominal surgery. No family history of colon cancer nor colon polyps. Patient quit tobacco 1993. Review of Systems   Constitutional: Negative for activity change, appetite change, chills, fever and unexpected weight change. HENT: Negative for nosebleeds, sneezing, sore throat and trouble swallowing. Eyes: Negative for visual disturbance. Respiratory: Negative for cough, choking and shortness of breath. Cardiovascular: Negative for chest pain, palpitations and leg swelling. Gastrointestinal: Negative for abdominal pain, blood in stool, nausea and vomiting. Genitourinary: Negative for dysuria, flank pain and hematuria. Musculoskeletal: Positive for arthralgias.  Negative for back pain, gait problem and myalgias. Allergic/Immunologic: Negative for immunocompromised state. Neurological: Negative for dizziness, seizures, syncope, weakness and headaches. Hematological: Does not bruise/bleed easily. Psychiatric/Behavioral: Negative for confusion and sleep disturbance. Past Medical History:   Diagnosis Date    Atrial fibrillation ([address] 2008    Cardiomyopathy [facility])     Congestive heart failure, unspecified     History of echocardiogram 04/26/2018    EF >55%. LV wall thickness is moderately increased. LA is moderately dilated (34-39) w/ LA volume index of 34. Mild mitral regurg. Mild tricuspid regurg. Diastology cannot be assessed to pt rhythm of what appears to be afib,    Hyperlipidemia        Past Surgical History:   Procedure Laterality Date    ANKLE SURGERY      right ankle. plate applied    COLONOSCOPY  2011    TONSILLECTOMY         Family History   Problem Relation Age of Onset    High Blood Pressure Mother     Heart Disease Mother     Stroke Mother     High Blood Pressure Sister     Other Brother         valve replacement in heart        Allergies:  See list    Current Outpatient Medications   Medication Sig Dispense Refill    apixaban (ELIQUIS) 5 MG TABS tablet Take 1 tablet by mouth 2 times daily 180 tablet 0    carvedilol (COREG) 25 MG tablet Take 1 tablet by mouth 2 times daily 180 tablet 0    atorvastatin (LIPITOR) 40 MG tablet Take 1 tablet by mouth daily 90 tablet 3    sacubitril-valsartan (ENTRESTO) 49-51 MG per tablet Take 1 tablet by mouth 2 times daily 180 tablet 3     No current facility-administered medications for this visit.         Social History     Socioeconomic History    Marital status:      Spouse name: None    Number of children: None    Years of education: None    Highest education level: None   Occupational History    None   Social Needs    Financial resource strain: None    Food insecurity     Worry: None     Inability: None    Transportation needs     Medical: None     Non-medical: None   Tobacco Use    Smoking status: Former Smoker     Packs/day: 0.50     Years: 20.00     Pack years: 10.00     Quit date: 1/10/1993     Years since quittin.1    Smokeless tobacco: Never Used   Substance and Sexual Activity    Alcohol use: No    Drug use: No    Sexual activity: None   Lifestyle    Physical activity     Days per week: None     Minutes per session: None    Stress: None   Relationships    Social connections     Talks on phone: None     Gets together: None     Attends [employer] service: None     Active member of club or organization: None     Attends meetings of clubs or organizations: None     Relationship status: None    Intimate partner violence     Fear of current or ex partner: None     Emotionally abused: None     Physically abused: None     Forced sexual activity: None   Other Topics Concern    None   Social History Narrative    None       /83 (Site: Left Upper Arm, Position: Sitting, Cuff Size: Medium Adult)   Pulse 90   Temp 97.7 °F (36.5 °C) (Infrared)   Resp 18   Ht 5' 9\" (1.753 m)   Wt 206 lb (93.4 kg)   BMI 30.42 kg/m²      Physical Exam  Vitals signs and nursing note reviewed. Constitutional:       Appearance: He is well-developed. HENT:      Head: Normocephalic and atraumatic. Eyes:      General: No scleral icterus. Conjunctiva/sclera: Conjunctivae normal.      Pupils: Pupils are equal, round, and reactive to light. Neck:      Musculoskeletal: Normal range of motion and neck supple. Vascular: No JVD. Trachea: No tracheal deviation. Cardiovascular:      Rate and Rhythm: Normal rate and regular rhythm. Pulmonary:      Effort: Pulmonary effort is normal. No respiratory distress. Chest:      Chest wall: No tenderness. Abdominal:      General: There is no distension. Palpations: Abdomen is soft. There is no mass. Tenderness: There is no abdominal tenderness.  There is no guarding or rebound. Musculoskeletal: Normal range of motion. Lymphadenopathy:      Cervical: No cervical adenopathy. Skin:     General: Skin is warm and dry. Findings: No erythema or rash. Neurological:      Mental Status: He is alert and oriented to person, place, and time. Cranial Nerves: No cranial nerve deficit. Psychiatric:         Behavior: Behavior normal.         Thought Content: Thought content normal.         Judgment: Judgment normal.         IMAGING/LABS    10/27/2020 10:27 AM - [facility] Incoming Lab Results From [facility]    Component Value Ref Range & Units Status Collected Lab   WBC 7.0  3.5 - 11.3 k/uL Final 10/27/2020  9:59 AM [facility]   RBC 4.83  4.21 - 5.77 m/uL Final 10/27/2020  9:59 AM [facility]   Hemoglobin 14.1  13.0 - 17.0 g/dL Final 10/27/2020  9:59 AM [facility]   Hematocrit 42.7  40.7 - 50.3 % Final 10/27/2020  9:59 AM [facility]   MCV 88.4  82.6 - 102.9 fL Final 10/27/2020  9:59 AM [facility]   MCH 29.2  25.2 - 33.5 pg Final 10/27/2020  9:59 AM [facility]   MCHC 33.0  28.4 - 34.8 g/dL Final 10/27/2020  9:59 AM [facility]   RDW 12.7  11.8 - 14.4 % Final 10/27/2020  9:59 AM [facility]   Platelets 517  016 - 453 k/uL Final 10/27/2020  9:59 AM [facility]   MPV 11.8  8.1 - 13.5 fL Final 10/27/2020  9:59 AM [facility]   NRBC Automated 0.0  0.0 per 100 WBC Final 10/27/2020  9:59 AM [facility]         ASSESSMENT     Diagnosis Orders   1. Positive FIT (fecal immunochemical test)     2. History of atrial fibrillation     3. BMI 30.0-30.9,adult     4. Pre-op testing         PLAN    We will proceed with diagnostic colonoscopy. Risks, benefits, alternatives thoroughly reviewed and accepted by Mr. Hansen, including GI bleeding, perforation, missed lesions, COVID-19 exposure/infection, etc.     Kaycee Gee MD

## 2021-03-09 NOTE — LETTER
[facility] GENERAL SURGERY [address] [place] [address]  Phone: [phone]  Fax: [phone]    David Chicas MD        March 9, 2021     [address], [place]  [facility] [address]    Patient: Baron Cueto  MR Number: [id number]  YOB: 1955  Date of Visit: 3/9/2021    Dear Dr. [address]aney [address]:    Thank you for the request for consultation for Kirsten Kimball to me for the evaluation of positive FIT. Below are the relevant portions of my assessment and plan of care. ASSESSMENT     Diagnosis Orders   1. Positive FIT (fecal immunochemical test)     2. History of atrial fibrillation     3. BMI 30.0-30.9,adult     4. Family history of colonic polyps     5. Pre-op testing         PLAN    We will proceed with diagnostic colonoscopy. Risks, benefits, alternatives thoroughly reviewed and accepted by Mr. Hansen, including GI bleeding, perforation, missed lesions, COVID-19 exposure/infection, etc.    If you have questions, please do not hesitate to call me. I look forward to following Mary Almanza along with you.     Sincerely,        David Chicas MD

## 2021-03-26 NOTE — PROGRESS NOTES
Patient instructed on the pre-operative, intra-operative, and post-operative process. Patient instructed on NPO status. Medication instructions and Pre operative instruction sheet reviewed over the phone. Instructed pt to take entresto and coreg with a small sip of water prior to arriving to the hospital the day of surgery. Informed pt that he will need to obtain blood thinner instructions from his cardiologist regarding when to stop eliquis for the procedure. Dr. Jason Cheung office notified that we need to have blood thinner instructions prior to surgery and that a request form was sent to Dr. Nusrat Chang office for Eliquis instructions.

## 2021-04-01 ENCOUNTER — ANESTHESIA EVENT (OUTPATIENT)
Dept: OPERATING ROOM | Age: 66
End: 2021-04-01
Payer: MEDICARE

## 2021-04-01 ENCOUNTER — HOSPITAL ENCOUNTER (OUTPATIENT)
Dept: PREADMISSION TESTING | Age: 66
Setting detail: SPECIMEN
Discharge: HOME | End: 2021-04-05
Payer: MEDICARE

## 2021-04-01 DIAGNOSIS — Z01.818 PREOPERATIVE TESTING: Primary | ICD-10-CM

## 2021-04-01 DIAGNOSIS — Z01.818 PREOPERATIVE TESTING: ICD-10-CM

## 2021-04-01 PROCEDURE — C9803 HOPD COVID-19 SPEC COLLECT: HCPCS

## 2021-04-01 PROCEDURE — U0005 INFEC AGEN DETEC AMPLI PROBE: HCPCS

## 2021-04-01 PROCEDURE — U0003 INFECTIOUS AGENT DETECTION BY NUCLEIC ACID (DNA OR RNA); SEVERE ACUTE RESPIRATORY SYNDROME CORONAVIRUS 2 (SARS-COV-2) (CORONAVIRUS DISEASE [COVID-19]), AMPLIFIED PROBE TECHNIQUE, MAKING USE OF HIGH THROUGHPUT TECHNOLOGIES AS DESCRIBED BY CMS-2020-01-R: HCPCS

## 2021-04-02 LAB
SARS-COV-2: NORMAL
SARS-COV-2: NOT DETECTED
SOURCE: NORMAL

## 2021-04-03 ENCOUNTER — TELEPHONE (OUTPATIENT)
Dept: PRIMARY CARE CLINIC | Age: 66
End: 2021-04-03

## 2021-04-05 ENCOUNTER — TELEPHONE (OUTPATIENT)
Dept: CARDIOLOGY | Age: 66
End: 2021-04-05

## 2021-04-05 NOTE — TELEPHONE ENCOUNTER
. Jose Rogers called and states he is having colonoscopy on 4/8/21. He is on Eliquis. I told him we usually hold 2-3 days but I would of double check first and call him back. Please advise.     Thank you     Froylan Yun

## 2021-04-08 ENCOUNTER — ANESTHESIA (OUTPATIENT)
Dept: OPERATING ROOM | Age: 66
End: 2021-04-08
Payer: MEDICARE

## 2021-04-08 ENCOUNTER — HOSPITAL ENCOUNTER (OUTPATIENT)
Age: 66
Setting detail: OUTPATIENT SURGERY
Discharge: HOME | End: 2021-04-08
Attending: SURGERY | Admitting: SURGERY
Payer: MEDICARE

## 2021-04-08 VITALS
OXYGEN SATURATION: 97 % | BODY MASS INDEX: 29.2 KG/M2 | RESPIRATION RATE: 16 BRPM | HEIGHT: 70 IN | TEMPERATURE: 97.2 F | WEIGHT: 204 LBS | SYSTOLIC BLOOD PRESSURE: 145 MMHG | HEART RATE: 92 BPM | DIASTOLIC BLOOD PRESSURE: 85 MMHG

## 2021-04-08 VITALS
DIASTOLIC BLOOD PRESSURE: 48 MMHG | RESPIRATION RATE: 19 BRPM | OXYGEN SATURATION: 92 % | SYSTOLIC BLOOD PRESSURE: 97 MMHG

## 2021-04-08 PROBLEM — R19.5 POSITIVE FIT (FECAL IMMUNOCHEMICAL TEST): Status: ACTIVE | Noted: 2021-04-08

## 2021-04-08 PROCEDURE — 7100000010 HC PHASE II RECOVERY - FIRST 15 MIN: Performed by: SURGERY

## 2021-04-08 PROCEDURE — 3700000000 HC ANESTHESIA ATTENDED CARE: Performed by: SURGERY

## 2021-04-08 PROCEDURE — 2709999900 HC NON-CHARGEABLE SUPPLY: Performed by: SURGERY

## 2021-04-08 PROCEDURE — 2580000003 HC RX 258: Performed by: SURGERY

## 2021-04-08 PROCEDURE — 3700000001 HC ADD 15 MINUTES (ANESTHESIA): Performed by: SURGERY

## 2021-04-08 PROCEDURE — 6360000002 HC RX W HCPCS: Performed by: NURSE ANESTHETIST, CERTIFIED REGISTERED

## 2021-04-08 PROCEDURE — 3609027000 HC COLONOSCOPY: Performed by: SURGERY

## 2021-04-08 PROCEDURE — 2500000003 HC RX 250 WO HCPCS: Performed by: NURSE ANESTHETIST, CERTIFIED REGISTERED

## 2021-04-08 PROCEDURE — 7100000011 HC PHASE II RECOVERY - ADDTL 15 MIN: Performed by: SURGERY

## 2021-04-08 RX ORDER — SODIUM CHLORIDE, SODIUM LACTATE, POTASSIUM CHLORIDE, CALCIUM CHLORIDE 600; 310; 30; 20 MG/100ML; MG/100ML; MG/100ML; MG/100ML
INJECTION, SOLUTION INTRAVENOUS CONTINUOUS
Status: DISCONTINUED | OUTPATIENT
Start: 2021-04-08 | End: 2021-04-08 | Stop reason: HOSPADM

## 2021-04-08 RX ORDER — SODIUM CHLORIDE 0.9 % (FLUSH) 0.9 %
10 SYRINGE (ML) INJECTION EVERY 12 HOURS SCHEDULED
Status: DISCONTINUED | OUTPATIENT
Start: 2021-04-08 | End: 2021-04-08 | Stop reason: HOSPADM

## 2021-04-08 RX ORDER — PROPOFOL 10 MG/ML
INJECTION, EMULSION INTRAVENOUS CONTINUOUS PRN
Status: DISCONTINUED | OUTPATIENT
Start: 2021-04-08 | End: 2021-04-08 | Stop reason: SDUPTHER

## 2021-04-08 RX ORDER — SODIUM CHLORIDE 9 MG/ML
25 INJECTION, SOLUTION INTRAVENOUS PRN
Status: DISCONTINUED | OUTPATIENT
Start: 2021-04-08 | End: 2021-04-08 | Stop reason: HOSPADM

## 2021-04-08 RX ORDER — SODIUM CHLORIDE 0.9 % (FLUSH) 0.9 %
10 SYRINGE (ML) INJECTION PRN
Status: DISCONTINUED | OUTPATIENT
Start: 2021-04-08 | End: 2021-04-08 | Stop reason: HOSPADM

## 2021-04-08 RX ORDER — LIDOCAINE HYDROCHLORIDE 20 MG/ML
INJECTION, SOLUTION EPIDURAL; INFILTRATION; INTRACAUDAL; PERINEURAL PRN
Status: DISCONTINUED | OUTPATIENT
Start: 2021-04-08 | End: 2021-04-08 | Stop reason: SDUPTHER

## 2021-04-08 RX ADMIN — SODIUM CHLORIDE, POTASSIUM CHLORIDE, SODIUM LACTATE AND CALCIUM CHLORIDE: 600; 310; 30; 20 INJECTION, SOLUTION INTRAVENOUS at 13:29

## 2021-04-08 RX ADMIN — PROPOFOL 250 MCG/KG/MIN: 10 INJECTION, EMULSION INTRAVENOUS at 15:40

## 2021-04-08 RX ADMIN — LIDOCAINE HYDROCHLORIDE 5 ML: 20 INJECTION, SOLUTION EPIDURAL; INFILTRATION; INTRACAUDAL; PERINEURAL at 15:40

## 2021-04-08 ASSESSMENT — LIFESTYLE VARIABLES: SMOKING_STATUS: 0

## 2021-04-08 ASSESSMENT — PAIN SCALES - GENERAL: PAINLEVEL_OUTOF10: 0

## 2021-04-08 NOTE — BRIEF OP NOTE
Brief Postoperative Note      Patient: Erika Cornejo  YOB: 1955  MRN: [id number]    Date of Procedure: 4/8/2021    Pre-Op Diagnosis:      1. Positive FIT    Post-Op Diagnosis:     1. Normal colon       Operation:     1. Colonoscopy anus to cecum    Surgeon(s):  Oneida Riddle MD    Assistant:  * No surgical staff found *    Anesthesia: Monitor Anesthesia Care    Estimated Blood Loss (mL):  None    Complications: None    Specimens:  None    Findings:   As above.       Dictated # [id number]    Electronically signed by Oneida Riddle MD on 4/8/2021 at 4:13 PM

## 2021-04-08 NOTE — PROGRESS NOTES
Patient verbalizes readiness for discharge. All criteria met. IV removed. All belongings provided to patient. Discharge Criteria    Inpatients must meet Criteria 1 through 7. All other patients are either YES or N/A. If a NO is chosen then Anesthesia or Surgeon must be notified. 1.  Minimum 30 minutes after last dose of sedative medication, minimum 120 minutes after last dose of reversal agent. Yes      2. Systolic BP stable within 20 mmHg for 30 minutes & systolic BP between 90 & 266 or within 10 mmHg of baseline. Yes      3. Pulse between 60 and 100 or within 10 bpm of baseline. Yes      4. Spontaneous respiratory rate >/= 10 per minute. Yes      5. SaO2 >/= 95 or  >/= baseline. Yes      6. Able to cough and swallow or return to baseline function. Yes      7. Alert and oriented or return to baseline mental status. Yes      8. Demonstrates controlled, coordinated movements, ambulates with steady gait, or return to baseline activity function. Yes      9. Minimal or no pain or nausea, or at a level tolerable and acceptable to patient. Yes      10. Takes and retains oral fluids as allowed. Yes      11. Procedural / perioperative site stable. Minimal or no bleeding. Yes          12. If GI endoscopy procedure, minimal or no abdominal distention or passing flatus. Yes      13. Written discharge instructions and emergency telephone number provided. Yes      14. Accompanied by a responsible adult.     Yes

## 2021-04-08 NOTE — ANESTHESIA POSTPROCEDURE EVALUATION
Department of Anesthesiology  Postprocedure Note    Patient: Armando Feldman  MRN: [id number]  YOB: 1955  Date of evaluation: 4/8/2021  Time:  4:29 PM     Procedure Summary     Date: 04/08/21 Room / Location: [address] [place] [address]    Anesthesia Start: 3483 Anesthesia Stop: 7554    Procedure: [address] NOT HIGH RISK (N/A ) Diagnosis: (POSITIVE FECAL IMMUNOCHEMICAL TEST)    Surgeons: Maribel Millard MD Responsible Provider: KATH Quiles CRNA    Anesthesia Type: general, TIVA ASA Status: 3          Anesthesia Type: general, TIVA    Chaparro Phase I: Chaparro Score: 10    Chaparro Phase II: Chaparro Score: 10    Last vitals: Reviewed and per EMR flowsheets.        Anesthesia Post Evaluation    Patient location during evaluation: PACU  Patient participation: complete - patient participated  Level of consciousness: awake and alert  Airway patency: patent  Nausea & Vomiting: no nausea and no vomiting  Complications: no  Cardiovascular status: blood pressure returned to baseline and hemodynamically stable  Respiratory status: acceptable and room air  Hydration status: euvolemic

## 2021-04-08 NOTE — OP NOTE
[address] [place], [place] [address]                                OPERATIVE REPORT    PATIENT NAME: ELIANE AVILEZ                     :        1955  MED REC NO:   [id number]                              ROOM:  ACCOUNT NO:   [de-identified]                           ADMIT DATE: 2021  PROVIDER:     Patrice Ojeda    DATE OF PROCEDURE:  2021    ATTENDING SURGEON:  Dr. Patrice Ojeda. PCP:  Martita England. PREOPERATIVE DIAGNOSIS:  Positive FIT. POSTOPERATIVE DIAGNOSIS:  Normal colon. OPERATION PERFORMED:  Colonoscopy, anus to cecum. ANESTHESIA:  MAC.    ESTIMATED BLOOD LOSS:  None. INDICATIONS:  The patient is a pleasant 59-year-old white male  presenting for evaluation of positive FIT. He has no GI complaints, no  epigastric pain nor reflux symptoms. Normal bowel movements. Colonoscopy 10 years ago was normal without polyps as the patient  recalls. No family history of colon cancer nor colon polyps to his  knowledge. Quit tobacco in . At this time, colonoscopy is  indicated. OPERATIVE PROCEDURE:  After obtaining informed consent with discussion  of risks, benefits, and alternatives including a risk of GI bleeding,  perforation, missed lesions, COVID-19 exposure/infection, etc., the  patient was taken to the endoscopy suite and placed in the left lateral  recumbent position. Following adequate IV sedation, a digital rectal  exam was performed. Sphincter tone was normal.  Prostate was minimally  enlarged with no discrete masses. A colonoscope was passed transanally  into the rectum and advanced with gentle insufflation throughout the  entirety of the colon to the cecum. Cecal position was confirmed by  clear visualization of the ileocecal valve, light in the right lower  quadrant, and transduction manual pressure in the right lower quadrant  to the cecum. The bowel prep was excellent.   All colonic mucosa was  clearly visible. The cecum, ascending colon, and hepatic flexure were  normal.  Transverse colon and splenic flexure were also normal.  The  descending colon and sigmoid were normal.  Upper, middle, and lower  portions of the rectum were normal.  On retroflexion, there were minimal  internal hemorrhoids. The colon was decompressed by suction as the  scope was removed. No biopsies were required. The patient tolerated  the procedure well and was transferred to PACU in stable condition. SPECIMENS:  None. DRAINS:  None. COMPLICATIONS:  None. DISPOSITION:  To PACU awake, alert, and stable. Following recovery, we  will discharge the patient home with gradual advancement of diet and  activity as tolerated with instructions for a high-fiber, low-fat diet  with fiber supplementation. We will recommend next screening  colonoscopy in 10 years given that the patient has no personal history  of colon polyps and no family history of colon cancer.         Thais Holt    D: 04/08/2021 16:18:03       T: 04/08/2021 16:25:54     [email or web address]  Job#: [id number]     Doc#: [id number]    CC:  Meena Braun

## 2021-04-08 NOTE — ANESTHESIA PRE PROCEDURE
Department of Anesthesiology  Preprocedure Note       Name:  Kt Randle   Age:  77 y.o.  :  1955                                          MRN:  [id number]         Date:  2021      Surgeon: Preston Cavazos):  Hawk Bonilla MD    Procedure: Procedure(s):  COLORECTAL CANCER SCREENING, NOT HIGH RISK    Medications prior to admission:   Prior to Admission medications    Medication Sig Start Date End Date Taking? Authorizing Provider   carvedilol (COREG) 25 MG tablet Take 1 tablet by mouth 2 times daily 3/25/21  Yes Tod Juarez MD   apixaban (ELIQUIS) 5 MG TABS tablet Take 1 tablet by mouth 2 times daily 21  Yes Tod Juarez MD   atorvastatin (LIPITOR) 40 MG tablet Take 1 tablet by mouth daily 20  Yes Jesse Hoff MD   sacubitril-valsartan [facility]) 49-51 MG per tablet Take 1 tablet by mouth 2 times daily 20  Yes Jesse Hoff MD       Current medications:    Current Facility-Administered Medications   Medication Dose Route Frequency Provider Last Rate Last Admin    lactated ringers infusion   Intravenous Continuous Hawk Bonilla  mL/hr at 21 1329 New Bag at 21 1329    sodium chloride flush 0.9 % injection 10 mL  10 mL Intravenous 2 times per day Hawk Bonilla MD        sodium chloride flush 0.9 % injection 10 mL  10 mL Intravenous PRN Hawk Bonilla MD        0.9 % sodium chloride infusion  25 mL Intravenous PRN Hawk Bonilla MD           Allergies:     Allergies   Allergen Reactions    Nutritional Supplements Anaphylaxis    Bee Venom        Problem List:    Patient Active Problem List   Diagnosis Code    Essential hypertension I10    Congestive heart failure (HCC) I50.9    Chronic atrial fibrillation (HCC) I48.20    Hyperlipidemia E78.5    Idiopathic cardiomyopathy (HCC) I42.8    Chronic combined systolic and diastolic CHF, NYHA class 2 (HCC) I50.42    Positive FIT (fecal immunochemical test) R19.5       Past Medical History: Diagnosis Date    Atrial fibrillation ([facility] [address]     Cardiomyopathy [facility])     Congestive heart failure, unspecified     History of echocardiogram 2018    EF >55%. LV wall thickness is moderately increased. LA is moderately dilated (34-39) w/ LA volume index of 34. Mild mitral regurg. Mild tricuspid regurg. Diastology cannot be assessed to pt rhythm of what appears to be afib,    Hyperlipidemia        Past Surgical History:        Procedure Laterality Date    ANKLE SURGERY      right ankle. plate applied    COLONOSCOPY  2011    TONSILLECTOMY         Social History:    Social History     Tobacco Use    Smoking status: Former Smoker     Packs/day: 0.50     Years: 20.00     Pack years: 10.00     Quit date: 1/10/1993     Years since quittin.2    Smokeless tobacco: Never Used   Substance Use Topics    Alcohol use: No                                Counseling given: Not Answered      Vital Signs (Current):   Vitals:    21 1312   BP: (!) 147/81   Pulse: 90   Resp: 18   Temp: 36.6 °C (97.8 °F)   TempSrc: Temporal   SpO2: 94%   Weight: 204 lb (92.5 kg)   Height: 5' 9.5\" (1.765 m)                                              BP Readings from Last 3 Encounters:   21 (!) 147/81   21 120/83   21 130/82       NPO Status: Time of last liquid consumption:                         Time of last solid consumption:                         Date of last liquid consumption: 21                        Date of last solid food consumption: 21    BMI:   Wt Readings from Last 3 Encounters:   21 204 lb (92.5 kg)   21 206 lb (93.4 kg)   21 203 lb (92.1 kg)     Body mass index is 29.69 kg/m².     CBC:   Lab Results   Component Value Date    WBC 7.0 10/27/2020    RBC 4.83 10/27/2020    RBC 4.75 05/15/2012    HGB 14.1 10/27/2020    HCT 42.7 10/27/2020    MCV 88.4 10/27/2020    RDW 12.7 10/27/2020     10/27/2020     05/15/2012       CMP:   Lab Results Component Value Date     10/27/2020    K 4.1 10/27/2020     10/27/2020    CO2 30 10/27/2020    BUN 16 10/27/2020    CREATININE 0.97 10/27/2020    GFRAA >60 10/27/2020    LABGLOM >60 10/27/2020    GLUCOSE 110 10/27/2020    GLUCOSE 101 05/15/2012    PROT 6.3 10/27/2020    CALCIUM 9.4 10/27/2020    BILITOT 0.82 10/27/2020    ALKPHOS 93 10/27/2020    AST 15 10/27/2020    ALT 20 10/27/2020       POC Tests: No results for input(s): POCGLU, POCNA, POCK, POCCL, POCBUN, POCHEMO, POCHCT in the last 72 hours. Coags:   Lab Results   Component Value Date    PROTIME 13.4 01/10/2013    INR 1.2 01/10/2013    APTT 41.1 01/10/2013       HCG (If Applicable): No results found for: PREGTESTUR, PREGSERUM, HCG, HCGQUANT     ABGs: No results found for: PHART, PO2ART, IOB7QOV, QST4CMY, BEART, V6XLDAQO     Type & Screen (If Applicable):  No results found for: LABABO, LABRH    Drug/Infectious Status (If Applicable):  No results found for: HIV, HEPCAB    COVID-19 Screening (If Applicable):   Lab Results   Component Value Date    COVID19 Not Detected 04/01/2021           Anesthesia Evaluation  Patient summary reviewed and Nursing notes reviewed no history of anesthetic complications:   Airway: Mallampati: II  TM distance: >3 FB   Neck ROM: full  Mouth opening: > = 3 FB Dental:          Pulmonary:Negative Pulmonary ROS and normal exam        (-) not a current smoker                           Cardiovascular:  Exercise tolerance: good (>4 METS),   (+) hypertension:, dysrhythmias (chronic- rate controlled ): atrial fibrillation, CHF:,     (-)  angina    ECG reviewed  Rhythm: irregular  Rate: normal  Echocardiogram reviewed    Cleared by cardiology              Neuro/Psych:   Negative Neuro/Psych ROS              GI/Hepatic/Renal:   (+) bowel prep,      (-) GERD       Endo/Other: Negative Endo/Other ROS                    Abdominal:   (+) obese,         Vascular: negative vascular ROS. Anesthesia Plan      general and TIVA     ASA 3       Induction: intravenous. Anesthetic plan and risks discussed with patient.                       KATH De La Torre - MARIELA   4/8/2021

## 2021-04-08 NOTE — H&P
HPI     Mr. Alma Guerra is a pleasant 51-year-old white male kindly referred to me by Dr. Danley Fleischer evaluation of positive FIT. He has no GI complaints. No abdominal pain. No epigastric pain nor reflux symptoms. Normal daily bowel movements, formed and brown, no visible blood. Normal appetite. No recent weight changes, 206 pounds, BMI 30. Colonoscopy proximately 10 years ago was normal, without polyps, as patient recalls. No cough, fever nor other respiratory symptoms. No history of COVID-19, received first vaccination, booster scheduled. History of A. fib and cardiomyopathy with CHF, currently taking Eliquis. Annual visits with Dr. Ralph Friday, most recently 5 months ago. No previous abdominal surgery. No family history of colon cancer nor colon polyps. Patient quit tobacco 1993.     Review of Systems   Constitutional: Negative for activity change, appetite change, chills, fever and unexpected weight change. HENT: Negative for nosebleeds, sneezing, sore throat and trouble swallowing. Eyes: Negative for visual disturbance. Respiratory: Negative for cough, choking and shortness of breath. Cardiovascular: Negative for chest pain, palpitations and leg swelling. Gastrointestinal: Negative for abdominal pain, blood in stool, nausea and vomiting. Genitourinary: Negative for dysuria, flank pain and hematuria. Musculoskeletal: Positive for arthralgias. Negative for back pain, gait problem and myalgias. Allergic/Immunologic: Negative for immunocompromised state. Neurological: Negative for dizziness, seizures, syncope, weakness and headaches. Hematological: Does not bruise/bleed easily. Psychiatric/Behavioral: Negative for confusion and sleep disturbance.         Past Medical History        Past Medical History:   Diagnosis Date    Atrial fibrillation ([address] 2008    Cardiomyopathy [facility])      Congestive heart failure, unspecified      History of echocardiogram 04/26/2018     EF >55%.  LV wall thickness  Sexual activity: None   Lifestyle    Physical activity       Days per week: None       Minutes per session: None    Stress: None   Relationships    Social connections       Talks on phone: None       Gets together: None       Attends [employer] service: None       Active member of club or organization: None       Attends meetings of clubs or organizations: None       Relationship status: None    Intimate partner violence       Fear of current or ex partner: None       Emotionally abused: None       Physically abused: None       Forced sexual activity: None   Other Topics Concern    None   Social History Narrative    None            /83 (Site: Left Upper Arm, Position: Sitting, Cuff Size: Medium Adult)   Pulse 90   Temp 97.7 °F (36.5 °C) (Infrared)   Resp 18   Ht 5' 9\" (1.753 m)   Wt 206 lb (93.4 kg)   BMI 30.42 kg/m²       Physical Exam  Vitals signs and nursing note reviewed. Constitutional:       Appearance: He is well-developed. HENT:      Head: Normocephalic and atraumatic. Eyes:      General: No scleral icterus. Conjunctiva/sclera: Conjunctivae normal.      Pupils: Pupils are equal, round, and reactive to light. Neck:      Musculoskeletal: Normal range of motion and neck supple. Vascular: No JVD. Trachea: No tracheal deviation. Cardiovascular:      Rate and Rhythm: Normal rate and regular rhythm. Pulmonary:      Effort: Pulmonary effort is normal. No respiratory distress. Chest:      Chest wall: No tenderness. Abdominal:      General: There is no distension. Palpations: Abdomen is soft. There is no mass. Tenderness: There is no abdominal tenderness. There is no guarding or rebound. Musculoskeletal: Normal range of motion. Lymphadenopathy:      Cervical: No cervical adenopathy. Skin:     General: Skin is warm and dry. Findings: No erythema or rash. Neurological:      Mental Status: He is alert and oriented to person, place, and time. Cranial Nerves: No cranial nerve deficit. Psychiatric:         Behavior: Behavior normal.         Thought Content: Thought content normal.         Judgment: Judgment normal.            IMAGING/LABS     10/27/2020 10:27 AM - Christian, Bharathi Incoming Lab Results From [facility]     Component Value Ref Range & Units Status Collected Lab   WBC 7.0  3.5 - 11.3 k/uL Final 10/27/2020  9:59 AM [facility]   RBC 4.83  4.21 - 5.77 m/uL Final 10/27/2020  9:59 AM [facility]   Hemoglobin 14.1  13.0 - 17.0 g/dL Final 10/27/2020  9:59 AM [facility]   Hematocrit 42.7  40.7 - 50.3 % Final 10/27/2020  9:59 AM [facility]   MCV 88.4  82.6 - 102.9 fL Final 10/27/2020  9:59 AM [facility]   MCH 29.2  25.2 - 33.5 pg Final 10/27/2020  9:59 AM [facility]   MCHC 33.0  28.4 - 34.8 g/dL Final 10/27/2020  9:59 AM [facility]   RDW 12.7  11.8 - 14.4 % Final 10/27/2020  9:59 AM [facility]   Platelets 691  024 - 453 k/uL Final 10/27/2020  9:59 AM [facility]   MPV 11.8  8.1 - 13.5 fL Final 10/27/2020  9:59 AM [facility]   NRBC Automated 0.0  0.0 per 100 WBC Final 10/27/2020  9:59 AM [facility]            ASSESSMENT       Diagnosis Orders   1. Positive FIT (fecal immunochemical test)      2. History of atrial fibrillation      3. BMI 30.0-30.9,adult      4. Pre-op testing            PLAN     We will proceed with diagnostic colonoscopy. Risks, benefits, alternatives thoroughly reviewed and accepted by Mr. Hansen, including GI bleeding, perforation, missed lesions, COVID-19 exposure/infection, etc.     Chris Turner MD

## 2021-04-29 ENCOUNTER — OFFICE VISIT (OUTPATIENT)
Dept: PRIMARY CARE CLINIC | Age: 66
End: 2021-04-29
Payer: MEDICARE

## 2021-04-29 VITALS
BODY MASS INDEX: 30.16 KG/M2 | RESPIRATION RATE: 16 BRPM | HEART RATE: 72 BPM | SYSTOLIC BLOOD PRESSURE: 124 MMHG | DIASTOLIC BLOOD PRESSURE: 69 MMHG | WEIGHT: 207.2 LBS

## 2021-04-29 DIAGNOSIS — I50.42 CHRONIC COMBINED SYSTOLIC AND DIASTOLIC CHF, NYHA CLASS 2 (HCC): Primary | ICD-10-CM

## 2021-04-29 DIAGNOSIS — I10 ESSENTIAL HYPERTENSION: ICD-10-CM

## 2021-04-29 DIAGNOSIS — I48.20 CHRONIC ATRIAL FIBRILLATION (HCC): ICD-10-CM

## 2021-04-29 DIAGNOSIS — E78.5 HYPERLIPIDEMIA, UNSPECIFIED HYPERLIPIDEMIA TYPE: ICD-10-CM

## 2021-04-29 PROCEDURE — 99213 OFFICE O/P EST LOW 20 MIN: CPT | Performed by: FAMILY MEDICINE

## 2021-04-29 RX ORDER — CARVEDILOL 25 MG/1
25 TABLET ORAL 2 TIMES DAILY
Qty: 180 TABLET | Refills: 0 | Status: SHIPPED | OUTPATIENT
Start: 2021-04-29 | End: 2021-07-29 | Stop reason: SDUPTHER

## 2021-04-29 NOTE — PROGRESS NOTES
Blood Pressure Sister     Other Brother         valve replacement in heart          Review of Systems:  Constitutional: negative for fever or chills  Eyes: negative for visual disturbance   ENT: negative for sore throat or nasal congestion  Respiratory: negative for cough, shortness of breath and sputum  Cardiovascular: negative for chest pain or palpitations,pnd  Gastrointestinal: negative for abd pain, nausea, vomiting, diarrhea or constipation  Genitourinary: negative for dysuria, urgency or frequency  Musculoskeletal:negative for arthralgias, muscle weakness and stiff joints   Integument/breast: negative for skin rash or lesions  Neurological: negative for unilateral weakness, numbness or tingling. Psych: negative for anxiety, depression, suicidial ideation and suicidal attempt. Objective:  Physical Exam:  GEN:   A & O x3, no apparent distress  EYES: No gross abnormalities. ENT:right and left TM normal without fluid or infection and neck without nodes  NECK: normal, no lymphadenopathy,  no carotid bruits  PULM: clear to auscultation bilaterally- no wheezes, rales or rhonchi, normal air movement, no respiratory distress  COR: no murmurs, no gallops and irregularly irregular  ABD:  soft, non-tender, non-distended, normal bowel sounds, no masses or organomegaly  : deferred  MUSC: no joint tenderness, deformity or swelling  EXT: Extremities: + 2 pedal pulses, no edema or calf tenderness, and warm to touch. Normal nails without lesions  NEURO: Motor and sensory grossly intact  SKIN:  No skin lesions or rashes          Assessment:  1. Chronic combined systolic and diastolic CHF, NYHA class 2 ([address]    2. Chronic atrial fibrillation (HCC)    3. Essential hypertension    4.  Hyperlipidemia, unspecified hyperlipidemia type      Patient Active Problem List   Diagnosis Code    Essential hypertension I10    Congestive heart failure (HCC) I50.9    Chronic atrial fibrillation (HCC) I48.20    Hyperlipidemia E78.5  Idiopathic cardiomyopathy (HCC) I42.8    Chronic combined systolic and diastolic CHF, NYHA class 2 (HCC) I50.42    Positive FIT (fecal immunochemical test) R19.5       Plan:  No orders of the defined types were placed in this encounter. continue current medications  Monitor for bleeding  Discussed diet and exercise  No orders of the defined types were placed in this encounter. No follow-ups on file.      Electronically signed by Brigida Wheatley MD on 4/29/2021 at 2:33 PM

## 2021-07-29 ENCOUNTER — OFFICE VISIT (OUTPATIENT)
Dept: PRIMARY CARE CLINIC | Age: 66
End: 2021-07-29
Payer: MEDICARE

## 2021-07-29 VITALS — HEART RATE: 74 BPM | BODY MASS INDEX: 30.39 KG/M2 | WEIGHT: 208.8 LBS

## 2021-07-29 DIAGNOSIS — E78.2 MIXED HYPERLIPIDEMIA: ICD-10-CM

## 2021-07-29 DIAGNOSIS — I42.9 IDIOPATHIC CARDIOMYOPATHY (HCC): ICD-10-CM

## 2021-07-29 DIAGNOSIS — I10 ESSENTIAL HYPERTENSION: ICD-10-CM

## 2021-07-29 DIAGNOSIS — I48.20 CHRONIC ATRIAL FIBRILLATION (HCC): ICD-10-CM

## 2021-07-29 DIAGNOSIS — I50.42 CHRONIC COMBINED SYSTOLIC AND DIASTOLIC CHF, NYHA CLASS 2 (HCC): ICD-10-CM

## 2021-07-29 PROCEDURE — 99214 OFFICE O/P EST MOD 30 MIN: CPT | Performed by: FAMILY MEDICINE

## 2021-07-29 RX ORDER — LOSARTAN POTASSIUM 50 MG/1
50 TABLET ORAL DAILY
Qty: 90 TABLET | Refills: 0 | Status: SHIPPED | OUTPATIENT
Start: 2021-07-29 | End: 2021-07-29

## 2021-07-29 RX ORDER — HYDRALAZINE HYDROCHLORIDE 25 MG/1
25 TABLET, FILM COATED ORAL 2 TIMES DAILY
Qty: 180 TABLET | Refills: 0 | Status: SHIPPED
Start: 2021-07-29 | End: 2021-10-25 | Stop reason: ALTCHOICE

## 2021-07-29 RX ORDER — CARVEDILOL 25 MG/1
25 TABLET ORAL 2 TIMES DAILY
Qty: 180 TABLET | Refills: 0 | Status: SHIPPED | OUTPATIENT
Start: 2021-07-29 | End: 2021-10-28 | Stop reason: SDUPTHER

## 2021-07-29 SDOH — ECONOMIC STABILITY: FOOD INSECURITY: WITHIN THE PAST 12 MONTHS, THE FOOD YOU BOUGHT JUST DIDN'T LAST AND YOU DIDN'T HAVE MONEY TO GET MORE.: NEVER TRUE

## 2021-07-29 SDOH — ECONOMIC STABILITY: FOOD INSECURITY: WITHIN THE PAST 12 MONTHS, YOU WORRIED THAT YOUR FOOD WOULD RUN OUT BEFORE YOU GOT MONEY TO BUY MORE.: NEVER TRUE

## 2021-07-29 SDOH — ECONOMIC STABILITY: TRANSPORTATION INSECURITY
IN THE PAST 12 MONTHS, HAS LACK OF TRANSPORTATION KEPT YOU FROM MEETINGS, WORK, OR FROM GETTING THINGS NEEDED FOR DAILY LIVING?: NO

## 2021-07-29 SDOH — ECONOMIC STABILITY: TRANSPORTATION INSECURITY
IN THE PAST 12 MONTHS, HAS THE LACK OF TRANSPORTATION KEPT YOU FROM MEDICAL APPOINTMENTS OR FROM GETTING MEDICATIONS?: NO

## 2021-07-29 ASSESSMENT — PATIENT HEALTH QUESTIONNAIRE - PHQ9
SUM OF ALL RESPONSES TO PHQ9 QUESTIONS 1 & 2: 1
1. LITTLE INTEREST OR PLEASURE IN DOING THINGS: 1
SUM OF ALL RESPONSES TO PHQ QUESTIONS 1-9: 1
SUM OF ALL RESPONSES TO PHQ QUESTIONS 1-9: 1
2. FEELING DOWN, DEPRESSED OR HOPELESS: 0
SUM OF ALL RESPONSES TO PHQ QUESTIONS 1-9: 1

## 2021-07-29 ASSESSMENT — SOCIAL DETERMINANTS OF HEALTH (SDOH): HOW HARD IS IT FOR YOU TO PAY FOR THE VERY BASICS LIKE FOOD, HOUSING, MEDICAL CARE, AND HEATING?: NOT HARD AT ALL

## 2021-07-29 NOTE — PROGRESS NOTES
Congestive Heart Failure:  Patient states he is doing good. Patient states he doesn't have any fatigue, bloating, or increased heart rate. Atrial Fibrillation: Patient states he is not getting any dizziness, fatigue or weakness. Hypertension: Patient here for follow-up of elevated blood pressure. He is not exercising and is adherent to low salt diet. Blood pressure is not well controlled at home. Cardiac symptoms none. Patient denies chest pain, fatigue and palpitations. Cardiovascular risk factors: advanced age (older than 54 for men, 72 for women), hypertension, male gender and obesity (BMI >= 30 kg/m2). Use of agents associated with hypertension: none. Hyperlipidemia: Patient presents with hyperlipidemia. There is a family history of hyperlipidemia. Recheck B/P 148/96 - Patient states he was a little late taking his B/P medicine today and says he took it a little after one today. Allergies:  Nutritional supplements and Bee venom    Past Medical History:    Past Medical History:   Diagnosis Date    Atrial fibrillation ([facility] 2008    Cardiomyopathy [address] [address]     Congestive heart failure, unspecified     History of echocardiogram 04/26/2018    EF >55%. LV wall thickness is moderately increased. LA is moderately dilated (34-39) w/ LA volume index of 34. Mild mitral regurg. Mild tricuspid regurg. Diastology cannot be assessed to pt rhythm of what appears to be afib,    Hyperlipidemia        Past Surgical History:    Past Surgical History:   Procedure Laterality Date    ANKLE SURGERY      right ankle.  plate applied    COLONOSCOPY  2011    COLONOSCOPY  04/08/2021    COLONOSCOPY N/A 4/8/2021    COLORECTAL CANCER SCREENING, NOT HIGH RISK performed by Loreto Dimas MD at [address] [place] [address] History:   Social History     Tobacco Use    Smoking status: Former Smoker     Packs/day: 0.50     Years: 20.00     Pack years: 10.00     Quit date: 1/10/1993     Years since quittin.5    Smokeless tobacco: Never Used   Substance Use Topics    Alcohol use: No       Family History:   Family History   Problem Relation Age of Onset    High Blood Pressure Mother     Heart Disease Mother     Stroke Mother     High Blood Pressure Sister     Other Brother         valve replacement in heart          Review of Systems:  Constitutional: negative for fevers or chills  Eyes: negative for visual disturbance   ENT: negative for sore throat or nasal congestion  Respiratory: no cough or shortness ofbreath  Cardiovascular: negative for chest pain or palpitations  Gastrointestinal: negative for abd pain, nausea, vomiting, diarrhea or constipation  Genitourinary: negative for dysuria, urgency or frequency  Integument/breast: negative for skin rash or lesions  Neurological: negative for unilateral weakness, numbness or tingling. Skeletal Muscular: no joint pain     Medication List          Accurate as of 2021  2:14 PM. If you have any questions, ask your nurse or doctor.             START taking these medications    hydrALAZINE 25 MG tablet  Commonly known as: APRESOLINE  Take 1 tablet by mouth 2 times daily        CONTINUE taking these medications    apixaban 5 MG Tabs tablet  Commonly known as: Eliquis  Take 1 tablet by mouth 2 times daily     atorvastatin 40 MG tablet  Commonly known as: LIPITOR  Take 1 tablet by mouth daily     carvedilol 25 MG tablet  Commonly known as: COREG  Take 1 tablet by mouth 2 times daily     Entresto 49-51 MG per tablet  Generic drug: sacubitril-valsartan  Take 1 tablet by mouth 2 times daily           Where to Get Your Medications      These medications were sent to [address] [phone]  [place] [address]    Phone: [phone]   · apixaban 5 MG Tabs tablet  · carvedilol 25 MG tablet  · hydrALAZINE 25 MG tablet         Objective:  Physical Exam:  VitalsPulse 74   Wt 208 lb 12.8 oz (94.7 kg)   BMI 30.39 kg/m²   GEN:   A & O x3, no apparent distress  EYES: No gross abnormalities. ENT:ENT exam normal, no neck nodes or sinus tenderness  NECK: normal, supple, no lymphadenopathy,  no carotid bruits  PULM: clear to auscultation bilaterally- no wheezes, rales or rhonchi, normal air movement, no respiratory distress  COR: no gallops, irregularly irregular and no murmer  ABD:  soft, non-tender, non-distended, normal bowel sounds, no masses or organomegaly  : deferred  EXT: normal strength, tone, and muscle mass, no edema  NEURO: Motor and sensory grossly intact  SKIN:  No skin lesions or rashes      Labs:  Lab Results   Component Value Date    BUN 16 10/27/2020    CREATININE 0.97 10/27/2020     10/27/2020    K 4.1 10/27/2020    CALCIUM 9.4 10/27/2020     10/27/2020    CO2 30 10/27/2020    LABGLOM >60 10/27/2020    CHOL 112 02/05/2021    LDLCHOLESTEROL 63 02/05/2021    HDL 31 (L) 02/05/2021    TRIG 89 02/05/2021    ALT 20 10/27/2020    AST 15 10/27/2020       Assessment:  1. Chronic combined systolic and diastolic CHF, NYHA class 2 ([address]    2. Idiopathic cardiomyopathy ([address]    3. Chronic atrial fibrillation ([address]    4. Essential hypertension    5. Mixed hyperlipidemia      Patient Active Problem List   Diagnosis Code    Essential hypertension I10    Congestive heart failure (HCC) I50.9    Chronic atrial fibrillation (HCC) I48.20    Hyperlipidemia E78.5    Idiopathic cardiomyopathy (HCC) I42.8    Chronic combined systolic and diastolic CHF, NYHA class 2 (HCC) I50.42    Positive FIT (fecal immunochemical test) R19.5         Plan:   Diagnosis Orders   1. Chronic combined systolic and diastolic CHF, NYHA class 2 (HCC)  clinically stable with entresto   2. Idiopathic cardiomyopathy ([address]  stable   3. Chronic atrial fibrillation (HCC)  In afib but rate well controlled   4. Essential hypertension  Not well controlled,add hydralazine 25 mg bid   5.  Mixed hyperlipidemia · Continue current medications  · Add hydralazine 25 mg bid  · Encouraged low sodium, low cholesterol, weight loss diet. · Goal for blood pressure controll is 130/80  · Recommended regular exercise as tolerated, 3-5 times per day  · Follow up in 3 months  ·     No orders of the defined types were placed in this encounter. Current Outpatient Medications   Medication Sig Dispense Refill    apixaban (ELIQUIS) 5 MG TABS tablet Take 1 tablet by mouth 2 times daily 180 tablet 0    carvedilol (COREG) 25 MG tablet Take 1 tablet by mouth 2 times daily 180 tablet 0    hydrALAZINE (APRESOLINE) 25 MG tablet Take 1 tablet by mouth 2 times daily 180 tablet 0    atorvastatin (LIPITOR) 40 MG tablet Take 1 tablet by mouth daily 90 tablet 3    sacubitril-valsartan (ENTRESTO) 49-51 MG per tablet Take 1 tablet by mouth 2 times daily 180 tablet 3     No current facility-administered medications for this visit. No follow-ups on file.       Electronically signed by Estevan Kam MD on 7/29/2021 at 2:14 PM

## 2021-08-13 ENCOUNTER — TELEPHONE (OUTPATIENT)
Dept: PRIMARY CARE CLINIC | Age: 66
End: 2021-08-13

## 2021-08-13 VITALS — SYSTOLIC BLOOD PRESSURE: 126 MMHG | DIASTOLIC BLOOD PRESSURE: 76 MMHG

## 2021-08-13 NOTE — TELEPHONE ENCOUNTER
Patient came in today to get his blood pressure check. In office today it was 126/76. Patient has been regularly taking his blood pressure at home. He said since starting his new medicine, it has been up and down at home.

## 2021-10-25 ENCOUNTER — OFFICE VISIT (OUTPATIENT)
Dept: CARDIOLOGY | Age: 66
End: 2021-10-25
Payer: MEDICARE

## 2021-10-25 VITALS
HEIGHT: 69 IN | SYSTOLIC BLOOD PRESSURE: 128 MMHG | WEIGHT: 206.4 LBS | DIASTOLIC BLOOD PRESSURE: 85 MMHG | RESPIRATION RATE: 18 BRPM | BODY MASS INDEX: 30.57 KG/M2 | OXYGEN SATURATION: 97 % | HEART RATE: 96 BPM

## 2021-10-25 DIAGNOSIS — I50.42 CHRONIC COMBINED SYSTOLIC AND DIASTOLIC CHF, NYHA CLASS 2 (HCC): Primary | ICD-10-CM

## 2021-10-25 DIAGNOSIS — I48.20 CHRONIC A-FIB (HCC): ICD-10-CM

## 2021-10-25 DIAGNOSIS — E78.2 MIXED HYPERLIPIDEMIA: ICD-10-CM

## 2021-10-25 DIAGNOSIS — I42.9 IDIOPATHIC CARDIOMYOPATHY (HCC): ICD-10-CM

## 2021-10-25 DIAGNOSIS — I10 ESSENTIAL HYPERTENSION: ICD-10-CM

## 2021-10-25 PROCEDURE — 93000 ELECTROCARDIOGRAM COMPLETE: CPT | Performed by: FAMILY MEDICINE

## 2021-10-25 PROCEDURE — 99213 OFFICE O/P EST LOW 20 MIN: CPT | Performed by: FAMILY MEDICINE

## 2021-10-25 RX ORDER — DILTIAZEM HYDROCHLORIDE 120 MG/1
120 CAPSULE, COATED, EXTENDED RELEASE ORAL DAILY
Qty: 90 CAPSULE | Refills: 3 | Status: SHIPPED | OUTPATIENT
Start: 2021-10-25 | End: 2022-10-17 | Stop reason: SDUPTHER

## 2021-10-25 NOTE — PATIENT INSTRUCTIONS
SURVEY:    You may be receiving a survey from [employer] regarding your visit today. Please complete the survey to enable us to provide the highest quality of care to you and your family. If you cannot score us a very good on any question, please call the office to discuss how we could have made your experience a very good one. Thank you.

## 2021-10-25 NOTE — PROGRESS NOTES
Amish Epperson am scribing for and in the presence of Wes Mccord. Judith LEOS, MS, F.A.C.C. Patient: Pat Briscoe  : 1955  Date of Visit: 2021    REASON FOR VISIT / CONSULTATION: Follow-up (Hx: CHF, chr afib, HTN, HLD. pt is here for 1 year f/u. Denies: CP, SOB, dizziness, lightheaded, palps)    Dear Guadalupe Trinidad MD,     I had the pleasure of seeing your patient Pat Briscoe in consultation today. As you know, Mr. Sneha Vera is a 77 y.o. male with a history of what sounds to be a non-ischemic cardiomyopathy back in  most likely secondary to a rate related cardiomyopathy due to atrial fibrillation. At that same time, he says he thinks he was diagnosed with blood clots in his lungs but denies any known issues or repeat problems since then. At that time his EF was apparently \"severely reduced\" but he is unsure of whether his heart ever got stronger or not. His EF in  was 35-40% on echocardiography. In , he was started on Entresto for his non ischemic cardiomyopathy. Echocardiogram done on 2018 showed an improved ejection fraction from 35-40% up to >55%. Since the last time I saw him, Mr. Sneha Vera reports doing well with no new problems. He denied any current or recent chest pain, abdominal pain, bleeding problems, problems with his medications or any other concerns at this time. No dizziness, lightheadedness or palpitations. Bleeding Risks: Mr. Sneha Vera denies any current or recent bleeding problems including a history of a GI bleed, ulcers, recent or upcoming surgeries, blood in his stool or black tarry stools or blood in his urine. Exercise Tolerance: Mr. Sneha Vera reports that he has an excellent exercise tolerance. His says that he could walk 1 mile without developing chest discomfort or significant shortness of breath.      Past Medical History:   Diagnosis Date    Atrial fibrillation ([address]     Cardiomyopathy [facility])     Congestive heart failure, unspecified  History of echocardiogram 2018    EF >55%. LV wall thickness is moderately increased. LA is moderately dilated (34-39) w/ LA volume index of 34. Mild mitral regurg. Mild tricuspid regurg. Diastology cannot be assessed to pt rhythm of what appears to be afib,    Hyperlipidemia        CURRENT ALLERGIES: Nutritional supplements and Bee venom REVIEW OF SYSTEMS: 14 systems were reviewed. Pertinent positives and negatives as above, all else negative. Past Surgical History:   Procedure Laterality Date    ANKLE SURGERY      right ankle. plate applied    COLONOSCOPY      COLONOSCOPY  2021    COLONOSCOPY N/A 2021    COLORECTAL CANCER SCREENING, NOT HIGH RISK performed by Cristino Duffy MD at [address] History:  Social History     Tobacco Use    Smoking status: Former Smoker     Packs/day: 0.50     Years: 20.00     Pack years: 10.00     Quit date: 1/10/1993     Years since quittin.8    Smokeless tobacco: Never Used   Substance Use Topics    Alcohol use: No    Drug use: No        CURRENT MEDICATIONS:  Outpatient Medications Marked as Taking for the 10/25/21 encounter (Office Visit) with Alexis Newton MD   Medication Sig Dispense Refill    apixaban (ELIQUIS) 5 MG TABS tablet Take 1 tablet by mouth 2 times daily 180 tablet 0    carvedilol (COREG) 25 MG tablet Take 1 tablet by mouth 2 times daily 180 tablet 0    hydrALAZINE (APRESOLINE) 25 MG tablet Take 1 tablet by mouth 2 times daily 180 tablet 0    atorvastatin (LIPITOR) 40 MG tablet Take 1 tablet by mouth daily 90 tablet 3    sacubitril-valsartan (ENTRESTO) 49-51 MG per tablet Take 1 tablet by mouth 2 times daily 180 tablet 3       FAMILY HISTORY: family history includes Heart Disease in his mother; High Blood Pressure in his mother and sister; Other in his brother; Stroke in his mother.      PHYSICAL EXAM:   /85 (Site: Right Upper Arm, Position: Sitting, Cuff Size: Medium Adult)   Pulse 96 Resp 18   Ht 5' 9.49\" (1.765 m)   Wt 206 lb 6.4 oz (93.6 kg)   SpO2 97%   BMI 30.05 kg/m²  Body mass index is 30.05 kg/m². Constitutional: He is oriented to person, place, and time. He appears well-developed and well-nourished. In no acute distress. HEENT: Normocephalic and atraumatic. No JVD present. Carotid bruit is not present. No mass and no thyromegaly present. No lymphadenopathy present. Cardiovascular: Normal rate, irregularly irregular rhythm, normal heart sounds. Exam reveals no gallop and no friction rubs. A IV/VI systolic murmur was heard maximally at the apex. Pulmonary/Chest: Effort normal and breath sounds normal. No respiratory distress. He has no wheezes, rhonchi or rales. Abdominal: Soft, non-tender. Bowel sounds and aorta are normal. He exhibits no organomegaly, mass or bruit. Extremities:  No edema, cyanosis, or clubbing. Pulses are 2+ radial/carotid/dorsalis pedis and posterior tibial bilaterally. Neurological: He is alert and oriented to person, place, and time. No evidence of gross cranial nerve deficit. Coordination appeared normal.   Skin: Skin is warm and dry. There is no rash or diaphoresis. Psychiatric: He has a normal mood and affect. His speech is normal and behavior is normal.      MOST RECENT LABS ON RECORD:   Lab Results   Component Value Date    WBC 7.0 10/27/2020    HGB 14.1 10/27/2020    HCT 42.7 10/27/2020     10/27/2020    CHOL 112 02/05/2021    TRIG 89 02/05/2021    HDL 31 (L) 02/05/2021    ALT 20 10/27/2020    AST 15 10/27/2020     10/27/2020    K 4.1 10/27/2020     10/27/2020    CREATININE 0.97 10/27/2020    BUN 16 10/27/2020    CO2 30 10/27/2020    TSH 1.12 04/24/2015    PSA 0.78 12/10/2019    INR 1.2 01/10/2013    LABA1C 5.4 02/11/2020       ASSESSMENT:  1. Chronic combined systolic and diastolic CHF, NYHA class 2 ([facility]    2. Chronic a-fib ([facility]    3. Essential hypertension    4. Mixed hyperlipidemia    5.  Idiopathic cardiomyopathy ([facility] PLAN:  · Chronic Diastolic Heart Failure/ History of a Non Ischemic Cardiomyopathy, EF improved from 35% up to 55% on 4/26/2018 via echocardiogram. Currently appears very well without active signs of heart failure  · Beta Blocker Therapy: Continue Carvedilol 25 mg twice daily. ACE Inibitor/ARB: Continue sacubitril/valsartan (Entresto) 49/51 mg every 12 hours    ·  Nonpharmacologic management of Heart Failure:  I advised him to try and keep his legs up whenever possible and to limit salt in his diet. · Chronic Atrial Fibrillation: Rate Control  · Beta Blocker Therapy: Continue Carvedilol 25 mg twice daily I discussed the potential side effects of this medication including lightheadedness and dizziness and told him to call the office if this occurs. · Calcium Channel Blocker: START diltiazem CD (Cardizem CD) 120 mg once daily. [id number] Score for Atrial Fibrillation Stroke Risk   Risk   Factors  Component Value   C CHF Yes 1   H HTN Yes 1   A2 Age >= 76 No,  (68 y.o.) 0   D DM No 0   S2 Prior Stroke/TIA No 0   V Vascular Disease No 0   A Age 74-69 Yes,  (68 y.o.) 1   Sc Sex male 0    [id number]  Score  3   Score last updated 21/39/94 8:32 PM EDT  Click here for a link to the UpToDate guideline \"Atrial Fibrillation: Anticoagulation therapy to prevent embolization  · Disclaimer: Risk Score calculation is dependent on accuracy of patient problem list and past encounter diagnosis. · His CHADS2 score is 3/9(3.2% stroke risk  · Anticoagulation: Continue Apixaban (Eliquis) 5 mg every 12 hours. I also reminded him to watch for signs of blood in his stool or black tarry stools and stop the medication immediately if this develops as this could be life threatening. Essential Hypertension: Controlled  · ACE Inibitor/ARB: Continue sacubitril/valsartan (Entresto) 49/51 mg every 12 hours    · Beta Blocker: Continue carvedilol (Coreg) 25 mg twice daily.      · Stop Hydralazine for simplification of his medications  · Calcium Channel Blocker: START diltiazem CD (Cardizem CD) 120 mg once daily. I also discussed the potential side effects of this medication including lightheadedness and dizziness and instructed them to stop the medication of this occurs and call our office if this occurs. I did this to get better control of his HR  · Stop hydralazine     · Hyperlipidemia: Mixed LDL: 63 on 2/5/2021  · Cholesterol Reduction Therapy: Continue Atorvastatin (Lipitor) 40 mg daily. Finally, I encouraged Mr. Hansen to continue to take his current medications and follow up with you as previously scheduled. FOLLOW UP:   I told Mr. Hansen to call my office if he had any problems, but otherwise told him to Return in about 1 year (around 10/25/2022). However, I would be happy to see him sooner should the need arise. Once again, thank you for allowing me to participate in this patients care. Sincerely,  Phu Wong MD, MS, F.A.C.C. [facility] Cardiology Specialist  [address] [place] [address]  Phone: [phone], Fax: [phone]     I believe that the risk of significant morbidity and mortality related to the patient's current medical conditions are: Intermediate. The documentation recorded by the scribe, accurately and completely reflects the services I personally performed and the decisions made by me. Jacob Downey MD, MS, F.A.C.C.  October 25, 2021

## 2021-10-28 ENCOUNTER — OFFICE VISIT (OUTPATIENT)
Dept: PRIMARY CARE CLINIC | Age: 66
End: 2021-10-28
Payer: MEDICARE

## 2021-10-28 VITALS
BODY MASS INDEX: 30.49 KG/M2 | WEIGHT: 209.4 LBS | SYSTOLIC BLOOD PRESSURE: 125 MMHG | HEART RATE: 67 BPM | DIASTOLIC BLOOD PRESSURE: 77 MMHG | RESPIRATION RATE: 16 BRPM

## 2021-10-28 DIAGNOSIS — I50.42 CHRONIC COMBINED SYSTOLIC AND DIASTOLIC CHF, NYHA CLASS 2 (HCC): Primary | ICD-10-CM

## 2021-10-28 DIAGNOSIS — I48.20 CHRONIC ATRIAL FIBRILLATION (HCC): ICD-10-CM

## 2021-10-28 DIAGNOSIS — I10 ESSENTIAL HYPERTENSION: ICD-10-CM

## 2021-10-28 DIAGNOSIS — E78.2 MIXED HYPERLIPIDEMIA: ICD-10-CM

## 2021-10-28 PROCEDURE — 99213 OFFICE O/P EST LOW 20 MIN: CPT | Performed by: FAMILY MEDICINE

## 2021-10-28 PROCEDURE — G0008 ADMIN INFLUENZA VIRUS VAC: HCPCS | Performed by: FAMILY MEDICINE

## 2021-10-28 PROCEDURE — 90694 VACC AIIV4 NO PRSRV 0.5ML IM: CPT | Performed by: FAMILY MEDICINE

## 2021-10-28 RX ORDER — CARVEDILOL 25 MG/1
25 TABLET ORAL 2 TIMES DAILY
Qty: 180 TABLET | Refills: 0 | Status: SHIPPED | OUTPATIENT
Start: 2021-10-28 | End: 2022-01-25 | Stop reason: SDUPTHER

## 2021-10-28 NOTE — PROGRESS NOTES
Congestive Heart Failure  Patient presents for re-evaluation of congestive heart failure. Patient's current complaints are none. He denies chest pain, fatigue and palpitations. He states he is compliant all of the time with his medications. He states he is compliant all of the time with his diet. Atrial Fibrillation: Patient denies palpitations, and shortness of breath at this time. Hypertension: Patient here for follow-up of elevated blood pressure. He is not exercising and is adherent to low salt diet. Blood pressure is well controlled at home. Cardiac symptoms none. Patient denies chest pain, fatigue and palpitations. Cardiovascular risk factors: advanced age (older than 54 for men, 72 for women), hypertension, male gender and obesity (BMI >= 30 kg/m2). Use of agents associated with hypertension: none. Hyperlipidemia: Patient presents with hyperlipidemia. There is a family history of hyperlipidemia. Allergies:  Nutritional supplements and Bee venom    Past Medical History:    Past Medical History:   Diagnosis Date    Atrial fibrillation ([address] 2008    [facility]     Congestive heart failure, unspecified     History of echocardiogram 04/26/2018    EF >55%. LV wall thickness is moderately increased. LA is moderately dilated (34-39) w/ LA volume index of 34. Mild mitral regurg. Mild tricuspid regurg. Diastology cannot be assessed to pt rhythm of what appears to be afib,    Hyperlipidemia        Past Surgical History:    Past Surgical History:   Procedure Laterality Date    ANKLE SURGERY      right ankle.  plate applied    COLONOSCOPY  2011    COLONOSCOPY  04/08/2021    COLONOSCOPY N/A 4/8/2021    COLORECTAL CANCER SCREENING, NOT HIGH RISK performed by Kenyatta Buchanan MD at [address] [place] [address] History:   Social History     Tobacco Use    Smoking status: Former Smoker     Packs/day: 0.50     Years: 20.00     Pack years: 10.00     Quit date: 1/10/1993     Years since quittin.8    Smokeless tobacco: Never Used   Substance Use Topics    Alcohol use: No       Family History:   Family History   Problem Relation Age of Onset    High Blood Pressure Mother     Heart Disease Mother     Stroke Mother     High Blood Pressure Sister     Other Brother         valve replacement in heart          Review of Systems:  Constitutional: negative for fevers or chills  Eyes: negative for visual disturbance   ENT: negative for sore throat or nasal congestion  Respiratory: no cough or shortness of breath  Cardiovascular: negative for chest pain or palpitations  Gastrointestinal: negative for abd pain, nausea, vomiting, diarrhea or constipation  Genitourinary: negative for dysuria, urgency or frequency  Integument/breast: negative for skin rash or lesions  Neurological: negative for unilateral weakness, numbness or tingling. Skeletal Muscular: no joint pain     Medication List          Accurate as of 2021  2:29 PM. If you have any questions, ask your nurse or doctor.             CONTINUE taking these medications    apixaban 5 MG Tabs tablet  Commonly known as: Eliquis  Take 1 tablet by mouth 2 times daily     atorvastatin 40 MG tablet  Commonly known as: LIPITOR  Take 1 tablet by mouth daily     carvedilol 25 MG tablet  Commonly known as: COREG  Take 1 tablet by mouth 2 times daily     dilTIAZem 120 MG extended release capsule  Commonly known as: CARDIZEM CD  Take 1 capsule by mouth daily     Entresto 49-51 MG per tablet  Generic drug: sacubitril-valsartan  Take 1 tablet by mouth 2 times daily           Where to Get Your Medications      [facility] [address] [place] [address] [phone]  [facility] [address]    Phone: [phone]   · apixaban 5 MG Tabs tablet  · carvedilol 25 MG tablet         Objective:  Physical Exam:  VitalsBP 125/77 (Site: Right Upper Arm, Position: Sitting)   Pulse 67   Resp 16   Wt 209 lb 6.4 oz (95 kg)   BMI 30.49 kg/m²   GEN:   A & O x3, no apparent distress  EYES: No gross abnormalities. ENT:ENT exam normal, no neck nodes or sinus tenderness  NECK: normal, supple, no lymphadenopathy,  no carotid bruits  PULM: clear to auscultation bilaterally- no wheezes, rales or rhonchi, normal air movement, no respiratory distress  COR: regular rate & rhythm and no gallops  ABD:  soft, non-tender, non-distended, normal bowel sounds, no masses or organomegaly  : deferred  EXT: normal strength, tone, and muscle mass  NEURO: Motor and sensory grossly intact  SKIN:  No skin lesions or rashes      Labs:  Lab Results   Component Value Date    BUN 16 10/27/2020    CREATININE 0.97 10/27/2020     10/27/2020    K 4.1 10/27/2020    CALCIUM 9.4 10/27/2020     10/27/2020    CO2 30 10/27/2020    LABGLOM >60 10/27/2020    CHOL 112 02/05/2021    LDLCHOLESTEROL 63 02/05/2021    HDL 31 (L) 02/05/2021    TRIG 89 02/05/2021    ALT 20 10/27/2020    AST 15 10/27/2020       Assessment:  1. Chronic combined systolic and diastolic CHF, NYHA class 2 ([address]    2. Chronic atrial fibrillation (HCC)    3. Essential hypertension    4. Mixed hyperlipidemia      Patient Active Problem List   Diagnosis Code    Essential hypertension I10    Congestive heart failure (HCC) I50.9    Chronic atrial fibrillation (HCC) I48.20    Hyperlipidemia E78.5    Idiopathic cardiomyopathy (HCC) I42.8    Chronic combined systolic and diastolic CHF, NYHA class 2 (HCC) I50.42    Positive FIT (fecal immunochemical test) R19.5   Plan:   Diagnosis Orders   1. Chronic combined systolic and diastolic CHF, NYHA class 2 (HCC) - stable with coreg and entresto    2. Chronic atrial fibrillation (HCC) - in sinus    3. Essential hypertension -well controlled    4. Mixed hyperlipidemia         · Continue current medications  · flu vaccine  · Encouraged low sodium, low cholesterol, weight loss diet.     · Goal for blood pressure controll is 120/80  · Recommended regular exercise as tolerated, 3-5 times per day  · Follow up in 3 months  ·     Orders Placed This Encounter   Procedures    INFLUENZA, QUADV, ADJUVANTED, 65 YRS =, IM, PF, PREFILL SYR, 0.5ML (FLUAD)       Current Outpatient Medications   Medication Sig Dispense Refill    apixaban (ELIQUIS) 5 MG TABS tablet Take 1 tablet by mouth 2 times daily 180 tablet 0    carvedilol (COREG) 25 MG tablet Take 1 tablet by mouth 2 times daily 180 tablet 0    dilTIAZem (CARDIZEM CD) 120 MG extended release capsule Take 1 capsule by mouth daily 90 capsule 3    atorvastatin (LIPITOR) 40 MG tablet Take 1 tablet by mouth daily 90 tablet 3    sacubitril-valsartan (ENTRESTO) 49-51 MG per tablet Take 1 tablet by mouth 2 times daily 180 tablet 3     No current facility-administered medications for this visit. No follow-ups on file.       Electronically signed by Lei Rojas MD on 10/28/2021 at 2:29 PM

## 2021-10-28 NOTE — PATIENT INSTRUCTIONS
SURVEY:    You may be receiving a survey from [employer] regarding your visit today. You may get this in the mail, through your MyChart, or in your email. Please complete the survey to enable us to provide the highest quality of care to you and your family. If you cannot score us a very good (5 Stars) on any question, please call the office to discuss how we could of made your experience exceptional.    Thank you!     FRANCINE Masters Dr., RN   [facility], [address] [place] [address] Bandar Ansari    Phone: [phone]  Fax: [phone]    Office Hours:   Barbie Guillen, F: 8-5 Wednesday: 9-11

## 2021-11-11 ENCOUNTER — HOSPITAL ENCOUNTER (OUTPATIENT)
Dept: NON INVASIVE DIAGNOSTICS | Age: 66
Discharge: HOME | End: 2021-11-11
Payer: MEDICARE

## 2021-11-11 DIAGNOSIS — I50.42 CHRONIC COMBINED SYSTOLIC AND DIASTOLIC CHF, NYHA CLASS 2 (HCC): ICD-10-CM

## 2021-11-11 DIAGNOSIS — I10 ESSENTIAL HYPERTENSION: ICD-10-CM

## 2021-11-11 DIAGNOSIS — E78.2 MIXED HYPERLIPIDEMIA: ICD-10-CM

## 2021-11-11 DIAGNOSIS — I42.9 IDIOPATHIC CARDIOMYOPATHY (HCC): ICD-10-CM

## 2021-11-11 DIAGNOSIS — I48.20 CHRONIC A-FIB (HCC): ICD-10-CM

## 2021-11-11 LAB
LV EF: 55 %
LVEF MODALITY: NORMAL

## 2021-11-11 PROCEDURE — 93306 TTE W/DOPPLER COMPLETE: CPT

## 2021-11-12 ENCOUNTER — TELEPHONE (OUTPATIENT)
Dept: CARDIOLOGY | Age: 66
End: 2021-11-12

## 2021-11-12 NOTE — TELEPHONE ENCOUNTER
----- Message from Alexis Newton MD sent at 11/11/2021  9:00 PM EST -----  Let Mr. Hansen know their test result was ok. Will discuss at next visit. Thanks.

## 2021-11-16 DIAGNOSIS — I50.42 CHRONIC COMBINED SYSTOLIC AND DIASTOLIC CHF, NYHA CLASS 2 (HCC): ICD-10-CM

## 2021-11-16 DIAGNOSIS — I42.9 IDIOPATHIC CARDIOMYOPATHY (HCC): ICD-10-CM

## 2021-11-16 DIAGNOSIS — I48.20 CHRONIC A-FIB (HCC): ICD-10-CM

## 2021-11-16 DIAGNOSIS — E78.2 MIXED HYPERLIPIDEMIA: ICD-10-CM

## 2021-11-16 DIAGNOSIS — I10 ESSENTIAL HYPERTENSION: ICD-10-CM

## 2021-11-16 DIAGNOSIS — I48.20 CHRONIC ATRIAL FIBRILLATION (HCC): ICD-10-CM

## 2021-11-16 RX ORDER — SACUBITRIL AND VALSARTAN 49; 51 MG/1; MG/1
1 TABLET, FILM COATED ORAL 2 TIMES DAILY
Qty: 180 TABLET | Refills: 3 | Status: SHIPPED | OUTPATIENT
Start: 2021-11-16

## 2022-01-06 DIAGNOSIS — I48.20 CHRONIC ATRIAL FIBRILLATION (HCC): ICD-10-CM

## 2022-01-06 DIAGNOSIS — I10 ESSENTIAL HYPERTENSION: ICD-10-CM

## 2022-01-06 DIAGNOSIS — E78.2 MIXED HYPERLIPIDEMIA: ICD-10-CM

## 2022-01-06 DIAGNOSIS — I50.42 CHRONIC COMBINED SYSTOLIC AND DIASTOLIC CHF, NYHA CLASS 2 (HCC): ICD-10-CM

## 2022-01-06 DIAGNOSIS — I48.20 CHRONIC A-FIB (HCC): ICD-10-CM

## 2022-01-06 DIAGNOSIS — I42.9 IDIOPATHIC CARDIOMYOPATHY (HCC): ICD-10-CM

## 2022-01-06 RX ORDER — ATORVASTATIN CALCIUM 40 MG/1
40 TABLET, FILM COATED ORAL DAILY
Qty: 90 TABLET | Refills: 3 | Status: SHIPPED | OUTPATIENT
Start: 2022-01-06

## 2022-01-25 RX ORDER — CARVEDILOL 25 MG/1
25 TABLET ORAL 2 TIMES DAILY
Qty: 180 TABLET | Refills: 0 | Status: SHIPPED | OUTPATIENT
Start: 2022-01-25 | End: 2022-04-28 | Stop reason: SDUPTHER

## 2022-02-01 ENCOUNTER — OFFICE VISIT (OUTPATIENT)
Dept: PRIMARY CARE CLINIC | Age: 67
End: 2022-02-01
Payer: MEDICARE

## 2022-02-01 VITALS
DIASTOLIC BLOOD PRESSURE: 77 MMHG | RESPIRATION RATE: 16 BRPM | SYSTOLIC BLOOD PRESSURE: 123 MMHG | BODY MASS INDEX: 31.45 KG/M2 | WEIGHT: 216 LBS | HEART RATE: 63 BPM

## 2022-02-01 DIAGNOSIS — I48.20 CHRONIC ATRIAL FIBRILLATION (HCC): Primary | ICD-10-CM

## 2022-02-01 DIAGNOSIS — I10 ESSENTIAL HYPERTENSION: ICD-10-CM

## 2022-02-01 DIAGNOSIS — I50.42 CHRONIC COMBINED SYSTOLIC AND DIASTOLIC CHF, NYHA CLASS 2 (HCC): ICD-10-CM

## 2022-02-01 PROCEDURE — 99214 OFFICE O/P EST MOD 30 MIN: CPT | Performed by: FAMILY MEDICINE

## 2022-02-01 NOTE — PROGRESS NOTES
Hypertension: Patient here for follow-up of elevated blood pressure. He is not exercising and is somewhat adherent to low salt diet. Blood pressure is not well controlled at home. Patient does not regularly check his blood pressure at home. Cardiac symptoms none. Patient denies chest pain, fatigue and palpitations. Cardiovascular risk factors: advanced age (older than 54 for men, 72 for women), hypertension, male gender and obesity (BMI >= 30 kg/m2). Use of agents associated with hypertension: none. Hyperlipidemia: Patient presents with hyperlipidemia. There is a family history of hyperlipidemia. Congestive Heart Failure  Patient presents for re-evaluation of congestive heart failure. Patient's current complaints are none. He denies chest pain, fatigue and palpitations. Atrial Fibrillation: Patient denies shortness of breath and palpitations at this time. Atrial fibrillation  Currently taking     Allergies:  Nutritional supplements and Bee venom    Past Medical History:    Past Medical History:   Diagnosis Date    Atrial fibrillation ([facility] 2008    Cardiomyopathy ([facility]     Congestive heart failure, unspecified     History of echocardiogram 04/26/2018    EF >55%. LV wall thickness is moderately increased. LA is moderately dilated (34-39) w/ LA volume index of 34. Mild mitral regurg. Mild tricuspid regurg. Diastology cannot be assessed to pt rhythm of what appears to be afib,    Hyperlipidemia        Past Surgical History:    Past Surgical History:   Procedure Laterality Date    ANKLE SURGERY      right ankle.  plate applied    COLONOSCOPY  2011    COLONOSCOPY  04/08/2021    COLONOSCOPY N/A 4/8/2021    COLORECTAL CANCER SCREENING, NOT HIGH RISK performed by Leon Bartholomew MD at [address] [place] [address] History:   Social History     Tobacco Use    Smoking status: Former Smoker     Packs/day: 0.50     Years: 20.00     Pack years: 10.00     Quit date: 1/10/1993 Years since quittin.0    Smokeless tobacco: Never Used   Substance Use Topics    Alcohol use: No       Family History:   Family History   Problem Relation Age of Onset    High Blood Pressure Mother     Heart Disease Mother     Stroke Mother     High Blood Pressure Sister     Other Brother         valve replacement in heart          Review of Systems:  Constitutional: negative for fever or chills  Eyes: negative for visual disturbance   ENT: negative for sore throat or nasal congestion  Respiratory: negative for cough, shortness of breath and sputum  Cardiovascular: negative for chest pain or palpitations  Gastrointestinal: negative for abd pain, nausea, vomiting, diarrhea or constipation  Genitourinary: negative for dysuria, urgency or frequency  Musculoskeletal:negative for arthralgias, muscle weakness and stiff joints   Integument/breast: negative for skin rash or lesions  Neurological: negative for unilateral weakness, numbness or tingling. Psych: negative for anxiety, depression, suicidial ideation and suicidal attempt. Objective:  Physical Exam:  GEN:   A & O x3, no apparent distress  EYES: No gross abnormalities. ENT:ENT exam normal, no neck nodes or sinus tenderness  NECK: normal, supple, no lymphadenopathy,  no carotid bruits  PULM: clear to auscultation bilaterally- no wheezes, rales or rhonchi, normal air movement, no respiratory distress  COR: regular rate & rhythm, no murmurs and no gallops  ABD:  soft, non-tender  : deferred  MUSC: no joint tenderness, deformity or swelling  EXT: Extremities: + 2 pedal pulses, no edema or calf tenderness, and warm to touch. Normal nails without lesions  NEURO: Motor and sensory grossly intact  SKIN:  No skin lesions or rashes          Assessment:    ICD-10-CM    1. Chronic atrial fibrillation ([facility])  I48.20    2. Chronic combined systolic and diastolic CHF, NYHA class 2 ([facility])  I50.42    3.  Essential hypertension  I10        Patient Active Problem List   Diagnosis Code    Essential hypertension I10    Congestive heart failure (HCC) I50.9    Chronic atrial fibrillation (HCC) I48.20    Hyperlipidemia E78.5    Idiopathic cardiomyopathy (HCC) I42.8    Chronic combined systolic and diastolic CHF, NYHA class 2 (HCC) I50.42    Positive FIT (fecal immunochemical test) R19.5       Plan:    ICD-10-CM    1. Chronic atrial fibrillation (HCC) - stable rate well controlled with carvidilol and cardizem,no side effect from eliquis I48.20    2. Chronic combined systolic and diastolic CHF, NYHA class 2 ([facility] - well controlled with entresto I50.42    3. Essential hypertension - well controlled with cardizem and carvidilol I10        Orders Placed This Encounter   Medications    apixaban (ELIQUIS) 5 MG TABS tablet     Sig: Take 1 tablet by mouth 2 times daily     Dispense:  180 tablet     Refill:  0     No orders of the defined types were placed in this encounter. Return in about 3 months (around 5/1/2022) for A Fib, hypertension.      Electronically signed by Pola Palm MD on 2/1/2022 at 2:18 PM

## 2022-02-01 NOTE — PATIENT INSTRUCTIONS
SURVEY:    You may be receiving a survey from [employer] regarding your visit today. You may get this in the mail, through your MyChart, or in your email. Please complete the survey to enable us to provide the highest quality of care to you and your family. If you cannot score us a very good (5 Stars) on any question, please call the office to discuss how we could of made your experience exceptional.    Thank you!     Dr. Miguel Valdes, FRANCINE Aiken, CATHERINE Bagley, [address] [place] [address], [address]    Phone: [phone]  Fax: [phone]    Office Hours:   Barbie Marion, F: 8-5 Wednesday: 9-11

## 2022-04-28 RX ORDER — CARVEDILOL 25 MG/1
25 TABLET ORAL 2 TIMES DAILY
Qty: 180 TABLET | Refills: 0 | Status: SHIPPED | OUTPATIENT
Start: 2022-04-28 | End: 2022-07-28 | Stop reason: SDUPTHER

## 2022-05-03 ENCOUNTER — OFFICE VISIT (OUTPATIENT)
Dept: PRIMARY CARE CLINIC | Age: 67
End: 2022-05-03
Payer: MEDICARE

## 2022-05-03 VITALS
BODY MASS INDEX: 29.86 KG/M2 | HEIGHT: 70 IN | SYSTOLIC BLOOD PRESSURE: 126 MMHG | HEART RATE: 72 BPM | RESPIRATION RATE: 16 BRPM | DIASTOLIC BLOOD PRESSURE: 77 MMHG | WEIGHT: 208.6 LBS

## 2022-05-03 DIAGNOSIS — I48.20 CHRONIC ATRIAL FIBRILLATION (HCC): ICD-10-CM

## 2022-05-03 DIAGNOSIS — Z12.5 ENCOUNTER FOR PROSTATE CANCER SCREENING: ICD-10-CM

## 2022-05-03 DIAGNOSIS — I50.42 CHRONIC COMBINED SYSTOLIC AND DIASTOLIC CHF, NYHA CLASS 2 (HCC): ICD-10-CM

## 2022-05-03 DIAGNOSIS — Z23 NEED FOR PNEUMOCOCCAL VACCINATION: ICD-10-CM

## 2022-05-03 DIAGNOSIS — Z00.00 MEDICARE ANNUAL WELLNESS VISIT, SUBSEQUENT: Primary | ICD-10-CM

## 2022-05-03 DIAGNOSIS — Z00.00 ENCOUNTER FOR MEDICARE ANNUAL WELLNESS EXAM: ICD-10-CM

## 2022-05-03 DIAGNOSIS — I10 ESSENTIAL HYPERTENSION: ICD-10-CM

## 2022-05-03 PROCEDURE — 90670 PCV13 VACCINE IM: CPT | Performed by: FAMILY MEDICINE

## 2022-05-03 PROCEDURE — G0009 ADMIN PNEUMOCOCCAL VACCINE: HCPCS | Performed by: FAMILY MEDICINE

## 2022-05-03 PROCEDURE — 99213 OFFICE O/P EST LOW 20 MIN: CPT | Performed by: FAMILY MEDICINE

## 2022-05-03 PROCEDURE — G0439 PPPS, SUBSEQ VISIT: HCPCS | Performed by: FAMILY MEDICINE

## 2022-05-03 ASSESSMENT — PATIENT HEALTH QUESTIONNAIRE - PHQ9
SUM OF ALL RESPONSES TO PHQ9 QUESTIONS 1 & 2: 0
1. LITTLE INTEREST OR PLEASURE IN DOING THINGS: 0
SUM OF ALL RESPONSES TO PHQ QUESTIONS 1-9: 0
SUM OF ALL RESPONSES TO PHQ QUESTIONS 1-9: 0
2. FEELING DOWN, DEPRESSED OR HOPELESS: 0
SUM OF ALL RESPONSES TO PHQ QUESTIONS 1-9: 0
SUM OF ALL RESPONSES TO PHQ QUESTIONS 1-9: 0

## 2022-05-03 ASSESSMENT — LIFESTYLE VARIABLES: HOW OFTEN DO YOU HAVE A DRINK CONTAINING ALCOHOL: NEVER

## 2022-05-03 NOTE — PATIENT INSTRUCTIONS
Personalized Preventive Plan for Jorje [place]ag[place]ett - 5/3/2022  Medicare offers a range of preventive health benefits. Some of the tests and screenings are paid in full while other may be subject to a deductible, co-insurance, and/or copay. Some of these benefits include a comprehensive review of your medical history including lifestyle, illnesses that may run in your family, and various assessments and screenings as appropriate. After reviewing your medical record and screening and assessments performed today your provider may have ordered immunizations, labs, imaging, and/or referrals for you. A list of these orders (if applicable) as well as your Preventive Care list are included within your After Visit Summary for your review. Other Preventive Recommendations:    · A preventive eye exam performed by an eye specialist is recommended every 1-2 years to screen for glaucoma; cataracts, macular degeneration, and other eye disorders. · A preventive dental visit is recommended every 6 months. · Try to get at least 150 minutes of exercise per week or 10,000 steps per day on a pedometer . · Order or download the FREE \"Exercise & Physical Activity: Your Everyday Guide\" from [employer]. Call 7-644.405.5278 or search [employer] online. · You need 1066-9919 mg of calcium and 7831-6065 IU of vitamin D per day. It is possible to meet your calcium requirement with diet alone, but a vitamin D supplement is usually necessary to meet this goal.  · When exposed to the sun, use a sunscreen that protects against both UVA and UVB radiation with an SPF of 30 or greater. Reapply every 2 to 3 hours or after sweating, drying off with a towel, or swimming. · Always wear a seat belt when traveling in a car. Always wear a helmet when riding a bicycle or motorcycle.

## 2022-05-03 NOTE — PROGRESS NOTES
Patient is here for his Medicare AWV, states he had Covid last month but has completely recovered. Medicare Annual Wellness Visit    Wilbert Martinez is here for Medicare AWV    Assessment & Plan   Encounter for Medicare annual wellness exam  Discussed advanced directives  [address] 13  Chronic atrial fibrillation ([address]- well controlled  Chronic combined systolic and diastolic CHF, NYHA class 2 ([address]  Essential hypertension  -     CBC with Auto Differential; Future  -     Comprehensive Metabolic Panel; Future  -     Lipid Panel; Future  Encounter for prostate cancer screening  -     PSA Screening; Future      Recommendations for Preventive Services Due: see orders and patient instructions/AVS.  Recommended screening schedule for the next 5-10 years is provided to the patient in written form: see Patient Instructions/AVS.     Return in about 3 months (around 8/3/2022) for hypertension. Subjective   The following acute and/or chronic problems were also addressed today:    ICD-10-CM    1. Encounter for Medicare annual wellness exam  Z00.00    2. Chronic atrial fibrillation (HCC)  I48.20    3. Chronic combined systolic and diastolic CHF, NYHA class 2 (HCC)  I50.42    4. Essential hypertension  I10 CBC with Auto Differential     Comprehensive Metabolic Panel     Lipid Panel   5. Encounter for prostate cancer screening  Z12.5 PSA Screening         Patient's complete Health Risk Assessment and screening values have been reviewed and are found in Flowsheets. The following problems were reviewed today and where indicated follow up appointments were made and/or referrals ordered.     Positive Risk Factor Screenings with Interventions:               General Health and ACP:  General  In general, how would you say your health is?: Very Good  In the past 7 days, have you experienced any of the following: New or Increased Pain, New or Increased Fatigue, Loneliness, Social Isolation, Stress or Anger?: No  Do you get the social and emotional support that you need?: Yes    Advance Directives     Power of  Living Will ACP-Advance Directive ACP-Power of     Not on File Not on File Not on File Not on File      General Health Risk Interventions:  · No Living Will: Advance Care Planning addressed with patient today    Health Habits/Nutrition:     Physical Activity: Inactive    Days of Exercise per Week: 0 days    Minutes of Exercise per Session: 10 min     Have you lost any weight without trying in the past 3 months?: No  Body mass index: (!) 30.36       Health Habits/Nutrition Interventions:  · Inadequate physical activity:  patient agrees to exercise for at least 150 minutes/week     Safety:  Do you have working smoke detectors?: Yes  Do you have any tripping hazards - loose or unsecured carpets or rugs?: No  Do you have any tripping hazards - clutter in doorways, halls, or stairs?: No  Do you have either shower bars, grab bars, non-slip mats or non-slip surfaces in your shower or bathtub?: (!) No  Do all of your stairways have a railing or banister?: Yes  Do you always fasten your seatbelt when you are in a car?: Yes    Safety Interventions:  · Home safety tips provided           Objective   Vitals:    05/03/22 1343   BP: 126/77   Site: Left Upper Arm   Position: Sitting   Pulse: 72   Resp: 16   Weight: 208 lb 9.6 oz (94.6 kg)   Height: 5' 9.5\" (1.765 m)      Body mass index is 30.36 kg/m².         General Appearance: alert and oriented to person, place and time, well developed and well- nourished, in no acute distress  Skin: warm and dry, no rash or erythema  Head: normocephalic and atraumatic  Eyes: pupils equal, round, and reactive to light, extraocular eye movements intact, conjunctivae normal  ENT: tympanic membrane, external ear and ear canal normal bilaterally, nose without deformity, nasal mucosa and turbinates normal without polyps  Neck: supple and non-tender without mass, no thyromegaly or thyroid nodules, no cervical lymphadenopathy  Pulmonary/Chest: clear to auscultation bilaterally- no wheezes, rales or rhonchi, normal air movement, no respiratory distress  Cardiovascular: Irregularly iregular,rate well controlled, normal S1 and S2, no murmurs, rubs, clicks, or gallops, distal pulses intact, no carotid bruits  Abdomen: soft, non-tender, non-distended, normal bowel sounds, no masses or organomegaly  Extremities: no cyanosis, clubbing or edema  Musculoskeletal: normal range of motion, no joint swelling, deformity or tenderness  Neurologic: reflexes normal and symmetric, no cranial nerve deficit, gait, coordination and speech normal       Allergies   Allergen Reactions    Nutritional Supplements Anaphylaxis    Bee Venom      Prior to Visit Medications    Medication Sig Taking?  Authorizing Provider   apixaban (ELIQUIS) 5 MG TABS tablet Take 1 tablet by mouth 2 times daily Yes Fidelia Rothman MD   carvedilol (COREG) 25 MG tablet Take 1 tablet by mouth 2 times daily  Fidelia Rothman MD   atorvastatin (LIPITOR) 40 MG tablet Take 1 tablet by mouth daily  Isabela Neil MD   sacubitril-valsartan (ENTRESTO) 49-51 MG per tablet Take 1 tablet by mouth 2 times daily  Isabela Neil MD   dilTIAZem (CARDIZEM CD) 120 MG extended release capsule Take 1 capsule by mouth daily  Isabela Neil MD       [facility] (Including outside providers/suppliers regularly involved in providing care):   Patient Care Team:  Fidelia Rothman MD as PCP - Lachelle Dumont MD as PCP - Jomar Washingtonled Provider  DR Magaly Wallace- cardiology  Reviewed and updated this visit:  Tobacco  Allergies  Meds  Med Hx  Surg Hx  Soc Hx  Fam Hx

## 2022-05-04 ENCOUNTER — HOSPITAL ENCOUNTER (OUTPATIENT)
Age: 67
Discharge: HOME | End: 2022-05-04
Payer: MEDICARE

## 2022-05-04 DIAGNOSIS — Z12.5 ENCOUNTER FOR PROSTATE CANCER SCREENING: ICD-10-CM

## 2022-05-04 DIAGNOSIS — I10 ESSENTIAL HYPERTENSION: ICD-10-CM

## 2022-05-04 LAB
ABSOLUTE EOS #: 0.18 K/UL (ref 0–0.44)
ABSOLUTE IMMATURE GRANULOCYTE: <0.03 K/UL (ref 0–0.3)
ABSOLUTE LYMPH #: 1.83 K/UL (ref 1.1–3.7)
ABSOLUTE MONO #: 0.62 K/UL (ref 0.1–1.2)
ALBUMIN SERPL-MCNC: 4.3 G/DL (ref 3.5–5.2)
ALBUMIN/GLOBULIN RATIO: 2.4 (ref 1–2.5)
ALP BLD-CCNC: 104 U/L (ref 40–129)
ALT SERPL-CCNC: 16 U/L (ref 5–41)
ANION GAP SERPL CALCULATED.3IONS-SCNC: 8 MMOL/L (ref 9–17)
AST SERPL-CCNC: 12 U/L
BASOPHILS # BLD: 1 % (ref 0–2)
BASOPHILS ABSOLUTE: 0.06 K/UL (ref 0–0.2)
BILIRUB SERPL-MCNC: 0.97 MG/DL (ref 0.3–1.2)
BUN BLDV-MCNC: 14 MG/DL (ref 8–23)
BUN/CREAT BLD: 15 (ref 9–20)
CALCIUM SERPL-MCNC: 9.3 MG/DL (ref 8.6–10.4)
CHLORIDE BLD-SCNC: 105 MMOL/L (ref 98–107)
CHOLESTEROL/HDL RATIO: 3.6
CHOLESTEROL: 109 MG/DL
CO2: 31 MMOL/L (ref 20–31)
CREAT SERPL-MCNC: 0.95 MG/DL (ref 0.7–1.2)
EOSINOPHILS RELATIVE PERCENT: 3 % (ref 1–4)
GFR AFRICAN AMERICAN: >60 ML/MIN
GFR NON-AFRICAN AMERICAN: >60 ML/MIN
GFR SERPL CREATININE-BSD FRML MDRD: ABNORMAL ML/MIN/{1.73_M2}
GFR SERPL CREATININE-BSD FRML MDRD: ABNORMAL ML/MIN/{1.73_M2}
GLUCOSE BLD-MCNC: 116 MG/DL (ref 70–99)
HCT VFR BLD CALC: 42.6 % (ref 40.7–50.3)
HDLC SERPL-MCNC: 30 MG/DL
HEMOGLOBIN: 14.1 G/DL (ref 13–17)
IMMATURE GRANULOCYTES: 0 %
LDL CHOLESTEROL: 63 MG/DL (ref 0–130)
LYMPHOCYTES # BLD: 28 % (ref 24–43)
MCH RBC QN AUTO: 29.7 PG (ref 25.2–33.5)
MCHC RBC AUTO-ENTMCNC: 33.1 G/DL (ref 28.4–34.8)
MCV RBC AUTO: 89.7 FL (ref 82.6–102.9)
MONOCYTES # BLD: 9 % (ref 3–12)
NRBC AUTOMATED: 0 PER 100 WBC
PDW BLD-RTO: 12.9 % (ref 11.8–14.4)
PLATELET # BLD: 170 K/UL (ref 138–453)
PMV BLD AUTO: 11.7 FL (ref 8.1–13.5)
POTASSIUM SERPL-SCNC: 4 MMOL/L (ref 3.7–5.3)
PROSTATE SPECIFIC ANTIGEN: 0.94 NG/ML
RBC # BLD: 4.75 M/UL (ref 4.21–5.77)
SEG NEUTROPHILS: 59 % (ref 36–65)
SEGMENTED NEUTROPHILS ABSOLUTE COUNT: 3.87 K/UL (ref 1.5–8.1)
SODIUM BLD-SCNC: 144 MMOL/L (ref 135–144)
TOTAL PROTEIN: 6.1 G/DL (ref 6.4–8.3)
TRIGL SERPL-MCNC: 80 MG/DL
WBC # BLD: 6.6 K/UL (ref 3.5–11.3)

## 2022-05-04 PROCEDURE — 36415 COLL VENOUS BLD VENIPUNCTURE: CPT

## 2022-05-04 PROCEDURE — 85025 COMPLETE CBC W/AUTO DIFF WBC: CPT

## 2022-05-04 PROCEDURE — G0103 PSA SCREENING: HCPCS

## 2022-05-04 PROCEDURE — 80053 COMPREHEN METABOLIC PANEL: CPT

## 2022-05-04 PROCEDURE — 80061 LIPID PANEL: CPT

## 2022-07-28 RX ORDER — CARVEDILOL 25 MG/1
25 TABLET ORAL 2 TIMES DAILY
Qty: 180 TABLET | Refills: 0 | Status: SHIPPED | OUTPATIENT
Start: 2022-07-28 | End: 2022-10-26 | Stop reason: SDUPTHER

## 2022-08-04 ENCOUNTER — OFFICE VISIT (OUTPATIENT)
Dept: PRIMARY CARE CLINIC | Age: 67
End: 2022-08-04
Payer: MEDICARE

## 2022-08-04 VITALS
RESPIRATION RATE: 16 BRPM | DIASTOLIC BLOOD PRESSURE: 72 MMHG | BODY MASS INDEX: 30.33 KG/M2 | WEIGHT: 208.4 LBS | HEART RATE: 60 BPM | SYSTOLIC BLOOD PRESSURE: 124 MMHG

## 2022-08-04 DIAGNOSIS — I50.42 CHRONIC COMBINED SYSTOLIC AND DIASTOLIC CHF, NYHA CLASS 2 (HCC): ICD-10-CM

## 2022-08-04 DIAGNOSIS — I48.20 CHRONIC ATRIAL FIBRILLATION (HCC): Primary | ICD-10-CM

## 2022-08-04 DIAGNOSIS — I10 ESSENTIAL HYPERTENSION: ICD-10-CM

## 2022-08-04 PROCEDURE — 1123F ACP DISCUSS/DSCN MKR DOCD: CPT | Performed by: FAMILY MEDICINE

## 2022-08-04 PROCEDURE — 99214 OFFICE O/P EST MOD 30 MIN: CPT | Performed by: FAMILY MEDICINE

## 2022-08-04 SDOH — ECONOMIC STABILITY: FOOD INSECURITY: WITHIN THE PAST 12 MONTHS, YOU WORRIED THAT YOUR FOOD WOULD RUN OUT BEFORE YOU GOT MONEY TO BUY MORE.: NEVER TRUE

## 2022-08-04 SDOH — ECONOMIC STABILITY: FOOD INSECURITY: WITHIN THE PAST 12 MONTHS, THE FOOD YOU BOUGHT JUST DIDN'T LAST AND YOU DIDN'T HAVE MONEY TO GET MORE.: NEVER TRUE

## 2022-08-04 ASSESSMENT — SOCIAL DETERMINANTS OF HEALTH (SDOH): HOW HARD IS IT FOR YOU TO PAY FOR THE VERY BASICS LIKE FOOD, HOUSING, MEDICAL CARE, AND HEATING?: NOT HARD AT ALL

## 2022-08-04 NOTE — PROGRESS NOTES
Hypertension: Patient here for follow-up of elevated blood pressure. He is exercising a little and is adherent to low salt diet. Blood pressure is not well controlled at home. Cardiac symptoms none. Patient denies chest pain, fatigue, and palpitations. Cardiovascular risk factors: advanced age (older than 54 for men, 72 for women), diabetes mellitus, hypertension, male gender, and obesity (BMI >= 30 kg/m2). Use of agents associated with hypertension: none. Congestive Heart Failure  Patient presents for re-evaluation of congestive heart failure. Patient's current complaints are none. He denies chest pain, fatigue, and palpitations. He states he is compliant all of the time with his medications. He states he is compliant most of the time with his diet. Atrial Fibrillation: Patient denies palpitations at this time. Atrial fibrillation  Currently taking     Allergies:  Nutritional supplements and Bee venom    Past Medical History:    Past Medical History:   Diagnosis Date    Atrial fibrillation ([address]     Cardiomyopathy [facility])     Congestive heart failure, unspecified     History of echocardiogram 2018    EF >55%. LV wall thickness is moderately increased. LA is moderately dilated (34-39) w/ LA volume index of 34. Mild mitral regurg. Mild tricuspid regurg. Diastology cannot be assessed to pt rhythm of what appears to be afib,    Hyperlipidemia        Past Surgical History:    Past Surgical History:   Procedure Laterality Date    ANKLE SURGERY      right ankle.  plate applied    COLONOSCOPY      COLONOSCOPY  2021    COLONOSCOPY N/A 2021    COLORECTAL CANCER SCREENING, NOT HIGH RISK performed by Samreen Morel MD at [address] [place] [address] History:   Social History     Tobacco Use    Smoking status: Former     Packs/day: 0.50     Years: 20.00     Pack years: 10.00     Types: Cigarettes     Quit date: 1/10/1993     Years since quittin.5    Smokeless tobacco: Never   Substance Use Topics    Alcohol use: No       Family History:   Family History   Problem Relation Age of Onset    High Blood Pressure Mother     Heart Disease Mother     Stroke Mother     High Blood Pressure Sister     Other Brother         valve replacement in heart          Review of Systems:  Constitutional: negative for fever or chills  Eyes: negative for visual disturbance   ENT: negative for sore throat or nasal congestion  Respiratory: negative for cough, shortness of breath and sputum  Cardiovascular: negative for chest pain or palpitations  Gastrointestinal: negative for abd pain, nausea, vomiting, diarrhea or constipation  Genitourinary: negative for dysuria, urgency or frequency  Musculoskeletal:negative for arthralgias, muscle weakness and stiff joints   Integument/breast: negative for skin rash or lesions  Neurological: negative for unilateral weakness, numbness or tingling. Objective:  Physical Exam:  GEN:   A & O x3, no apparent distress  EYES: No gross abnormalities. ENT:ENT exam normal, no neck nodes or sinus tenderness  NECK: normal, supple, no lymphadenopathy,  no carotid bruits  PULM: clear to auscultation bilaterally- no wheezes, rales or rhonchi, normal air movement, no respiratory distress  COR: regular rate & rhythm and 2/6 murmer  ABD:  soft, non-tender, non-distended, normal bowel sounds, no masses or organomegaly  : deferred  MUSC: no joint tenderness, deformity or swelling  EXT: Extremities: + 2 pedal pulses, no edema or calf tenderness, and warm to touch. Normal nails without lesions  NEURO: Motor and sensory grossly intact  SKIN:  No skin lesions or rashes          Assessment:   Diagnosis Orders   1. Chronic atrial fibrillation ([address]        2. Chronic combined systolic and diastolic CHF, NYHA class 2 ([address]        3.  Essential hypertension            Patient Active Problem List   Diagnosis Code    Essential hypertension I10    Congestive heart failure (HCC) I50.9 Chronic atrial fibrillation (HCC) I48.20    Hyperlipidemia E78.5    Idiopathic cardiomyopathy (HCC) I42.8    Chronic combined systolic and diastolic CHF, NYHA class 2 (HCC) I50.42    Positive FIT (fecal immunochemical test) R19.5       Plan:    ICD-10-CM    1. Chronic atrial fibrillation (HCC) -stable,no side effects rom eliquis with carvidilol and cardizem I48.20       2. Chronic combined systolic and diastolic CHF, NYHA class 2 ([address] -well controlled with carvidilol I50.42       3.  Essential hypertension -well controlled with cardizem,carvidilol I10           Electronically signed by Jaqueline Saab MD on 8/4/2022 at 1:25 PM

## 2022-08-04 NOTE — PATIENT INSTRUCTIONS
SURVEY:    You may be receiving a survey from [employer] regarding your visit today. You may get this in the mail, through your MyChart, or in your email. Please complete the survey to enable us to provide the highest quality of care to you and your family. If you cannot score us a very good (5 Stars) on any question, please call the office to discuss how we could of made your experience exceptional.    Thank you!     Dr. Willem Simmons, FRANCINE Singh, [address] [place] [address], [address] [place] [address]    Phone: [phone]  Fax: [phone]    Office Hours:   Kervin Tsang, [facility] [place], F: 8-5

## 2022-09-15 ENCOUNTER — NURSE ONLY (OUTPATIENT)
Dept: PRIMARY CARE CLINIC | Age: 67
End: 2022-09-15
Payer: MEDICARE

## 2022-09-15 DIAGNOSIS — Z23 NEED FOR PROPHYLACTIC VACCINATION AND INOCULATION AGAINST INFLUENZA: Primary | ICD-10-CM

## 2022-09-15 PROCEDURE — G0008 ADMIN INFLUENZA VIRUS VAC: HCPCS | Performed by: FAMILY MEDICINE

## 2022-09-15 PROCEDURE — 90694 VACC AIIV4 NO PRSRV 0.5ML IM: CPT | Performed by: FAMILY MEDICINE

## 2022-10-14 DIAGNOSIS — I10 ESSENTIAL HYPERTENSION: ICD-10-CM

## 2022-10-14 DIAGNOSIS — I48.20 CHRONIC A-FIB (HCC): ICD-10-CM

## 2022-10-14 DIAGNOSIS — I42.9 IDIOPATHIC CARDIOMYOPATHY (HCC): ICD-10-CM

## 2022-10-14 DIAGNOSIS — E78.2 MIXED HYPERLIPIDEMIA: ICD-10-CM

## 2022-10-14 DIAGNOSIS — I50.42 CHRONIC COMBINED SYSTOLIC AND DIASTOLIC CHF, NYHA CLASS 2 (HCC): ICD-10-CM

## 2022-10-15 DIAGNOSIS — I10 ESSENTIAL HYPERTENSION: ICD-10-CM

## 2022-10-15 DIAGNOSIS — E78.2 MIXED HYPERLIPIDEMIA: ICD-10-CM

## 2022-10-15 DIAGNOSIS — I50.42 CHRONIC COMBINED SYSTOLIC AND DIASTOLIC CHF, NYHA CLASS 2 (HCC): ICD-10-CM

## 2022-10-15 DIAGNOSIS — I48.20 CHRONIC A-FIB (HCC): ICD-10-CM

## 2022-10-15 DIAGNOSIS — I42.9 IDIOPATHIC CARDIOMYOPATHY (HCC): ICD-10-CM

## 2022-10-17 DIAGNOSIS — I50.42 CHRONIC COMBINED SYSTOLIC AND DIASTOLIC CHF, NYHA CLASS 2 (HCC): ICD-10-CM

## 2022-10-17 DIAGNOSIS — I10 ESSENTIAL HYPERTENSION: ICD-10-CM

## 2022-10-17 DIAGNOSIS — I48.20 CHRONIC A-FIB (HCC): ICD-10-CM

## 2022-10-17 DIAGNOSIS — I42.9 IDIOPATHIC CARDIOMYOPATHY (HCC): ICD-10-CM

## 2022-10-17 DIAGNOSIS — E78.2 MIXED HYPERLIPIDEMIA: ICD-10-CM

## 2022-10-17 RX ORDER — DILTIAZEM HYDROCHLORIDE 120 MG/1
120 CAPSULE, COATED, EXTENDED RELEASE ORAL DAILY
Qty: 90 CAPSULE | Refills: 3 | Status: SHIPPED
Start: 2022-10-17 | End: 2022-10-26 | Stop reason: SDUPTHER

## 2022-10-17 RX ORDER — DILTIAZEM HYDROCHLORIDE 120 MG/1
120 CAPSULE, COATED, EXTENDED RELEASE ORAL DAILY
Qty: 90 CAPSULE | Refills: 3 | Status: SHIPPED
Start: 2022-10-17 | End: 2022-10-26 | Stop reason: ALTCHOICE

## 2022-10-17 RX ORDER — DILTIAZEM HYDROCHLORIDE 120 MG/1
CAPSULE, COATED, EXTENDED RELEASE ORAL
Qty: 90 CAPSULE | Refills: 3 | Status: SHIPPED
Start: 2022-10-17 | End: 2022-10-26 | Stop reason: SDUPTHER

## 2022-10-26 ENCOUNTER — HOSPITAL ENCOUNTER (OUTPATIENT)
Age: 67
Discharge: HOME | End: 2022-10-26
Payer: MEDICARE

## 2022-10-26 ENCOUNTER — OFFICE VISIT (OUTPATIENT)
Dept: CARDIOLOGY | Age: 67
End: 2022-10-26
Payer: MEDICARE

## 2022-10-26 VITALS
HEART RATE: 50 BPM | WEIGHT: 209 LBS | BODY MASS INDEX: 29.92 KG/M2 | RESPIRATION RATE: 17 BRPM | SYSTOLIC BLOOD PRESSURE: 125 MMHG | HEIGHT: 70 IN | OXYGEN SATURATION: 96 % | DIASTOLIC BLOOD PRESSURE: 84 MMHG

## 2022-10-26 DIAGNOSIS — I42.9 IDIOPATHIC CARDIOMYOPATHY (HCC): ICD-10-CM

## 2022-10-26 DIAGNOSIS — Z79.01 ENCOUNTER FOR CURRENT LONG-TERM USE OF ANTICOAGULANTS: Primary | ICD-10-CM

## 2022-10-26 DIAGNOSIS — I10 ESSENTIAL HYPERTENSION: ICD-10-CM

## 2022-10-26 DIAGNOSIS — R00.1 BRADYCARDIA: ICD-10-CM

## 2022-10-26 DIAGNOSIS — I48.0 PAF (PAROXYSMAL ATRIAL FIBRILLATION) (HCC): ICD-10-CM

## 2022-10-26 LAB
ANION GAP SERPL CALCULATED.3IONS-SCNC: 8 MMOL/L (ref 9–17)
BUN BLDV-MCNC: 12 MG/DL (ref 8–23)
BUN/CREAT BLD: 13 (ref 9–20)
CALCIUM SERPL-MCNC: 9.2 MG/DL (ref 8.6–10.4)
CHLORIDE BLD-SCNC: 101 MMOL/L (ref 98–107)
CO2: 31 MMOL/L (ref 20–31)
CREAT SERPL-MCNC: 0.94 MG/DL (ref 0.7–1.2)
GFR SERPL CREATININE-BSD FRML MDRD: >60 ML/MIN/1.73M2
GLUCOSE BLD-MCNC: 107 MG/DL (ref 70–99)
HCT VFR BLD CALC: 43.5 % (ref 40.7–50.3)
HEMOGLOBIN: 14.4 G/DL (ref 13–17)
MCH RBC QN AUTO: 30.1 PG (ref 25.2–33.5)
MCHC RBC AUTO-ENTMCNC: 33.1 G/DL (ref 28.4–34.8)
MCV RBC AUTO: 90.8 FL (ref 82.6–102.9)
NRBC AUTOMATED: 0 PER 100 WBC
PDW BLD-RTO: 12.8 % (ref 11.8–14.4)
PLATELET # BLD: 167 K/UL (ref 138–453)
PMV BLD AUTO: 11.5 FL (ref 8.1–13.5)
POTASSIUM SERPL-SCNC: 4.5 MMOL/L (ref 3.7–5.3)
RBC # BLD: 4.79 M/UL (ref 4.21–5.77)
SODIUM BLD-SCNC: 140 MMOL/L (ref 135–144)
WBC # BLD: 6.4 K/UL (ref 3.5–11.3)

## 2022-10-26 PROCEDURE — 93000 ELECTROCARDIOGRAM COMPLETE: CPT | Performed by: FAMILY MEDICINE

## 2022-10-26 PROCEDURE — 3074F SYST BP LT 130 MM HG: CPT | Performed by: FAMILY MEDICINE

## 2022-10-26 PROCEDURE — 99214 OFFICE O/P EST MOD 30 MIN: CPT | Performed by: FAMILY MEDICINE

## 2022-10-26 PROCEDURE — 1123F ACP DISCUSS/DSCN MKR DOCD: CPT | Performed by: FAMILY MEDICINE

## 2022-10-26 PROCEDURE — 36415 COLL VENOUS BLD VENIPUNCTURE: CPT

## 2022-10-26 PROCEDURE — 80048 BASIC METABOLIC PNL TOTAL CA: CPT

## 2022-10-26 PROCEDURE — 85027 COMPLETE CBC AUTOMATED: CPT

## 2022-10-26 PROCEDURE — 3078F DIAST BP <80 MM HG: CPT | Performed by: FAMILY MEDICINE

## 2022-10-26 RX ORDER — AMLODIPINE BESYLATE 5 MG/1
5 TABLET ORAL DAILY
Qty: 90 TABLET | Refills: 3 | Status: SHIPPED | OUTPATIENT
Start: 2022-10-26

## 2022-10-26 RX ORDER — CARVEDILOL 25 MG/1
25 TABLET ORAL 2 TIMES DAILY
Qty: 180 TABLET | Refills: 3 | Status: SHIPPED | OUTPATIENT
Start: 2022-10-26

## 2022-10-26 RX ORDER — CARVEDILOL 25 MG/1
25 TABLET ORAL 2 TIMES DAILY
Qty: 180 TABLET | Refills: 0 | Status: CANCELLED | OUTPATIENT
Start: 2022-10-26

## 2022-10-26 NOTE — PROGRESS NOTES
Gayle Chaudhary am scribing for and in the presence of Tash Wong MD, MS, F.A.C.C. Patient: Katherin Chris  : 1955  Date of Visit: 2022    REASON FOR VISIT / CONSULTATION: Follow-up (HX: CHF, Chronic A-Fib, Idiopathic Cardio. Pt states he is doing well. Denies: CP, SOB, Lightheaded/dizziness, Palpitations. )    Dear Solitario Harris MD,     I had the pleasure of seeing your patient Katherin Chris in consultation today. As you know, Mr. Krish Rivas is a 79 y.o. male with a history of what sounds to be a non-ischemic cardiomyopathy back in  most likely secondary to a rate related cardiomyopathy due to atrial fibrillation. At that same time, he says he thinks he was diagnosed with blood clots in his lungs but denies any known issues or repeat problems since then. At that time his EF was apparently \"severely reduced\" but he is unsure of whether his heart ever got stronger or not. His EF in  was 35-40% on echocardiography. In , he was started on Entresto for his non ischemic cardiomyopathy. Echocardiogram done on 2018 showed an improved ejection fraction from 35-40% up to >55%. Echocardiogram done on 2021: EF of >55% Moderately increased LV wall thickness. Left atrium mildly dilated. Mild mitral regurgitation. Mild to moderate tricuspid regurgitation. Evidence of mild diastolic dysfunction is seen. Since the last time I saw him, Mr. Krish Rivas reports doing well cardiac wise with no new problems. He is worried about his pulse being in the lower side in the 50's. He does have some tiredness/fatigue and doesn't know if it is related to his age. He sleeps on average 6 hours a night, he does wake several times to urinate. He does wake up fairly tired. He denied any current or recent chest pain, pressure or tightness. He denies having any shortness of breath, lightheaded/dizziness or palpitations.  No abdominal pain, bleeding problems, problems with his medications or any other concerns at this time. No cough, fever or chills. No nausea or vomiting. Bleeding Risks: Mr. Venkat Andrade denies any current or recent bleeding problems including a history of a GI bleed, ulcers, recent or upcoming surgeries, blood in his stool or black tarry stools or blood in his urine. Exercise Tolerance: Mr. Venkat Andrade reports that he has an excellent exercise tolerance. His says that he could walk 1 mile without developing chest discomfort or significant shortness of breath. Past Medical History:   Diagnosis Date    Atrial fibrillation ([address]     Cardiomyopathy [facility])     Congestive heart failure, unspecified     History of echocardiogram 2018    EF >55%. LV wall thickness is moderately increased. LA is moderately dilated (34-39) w/ LA volume index of 34. Mild mitral regurg. Mild tricuspid regurg. Diastology cannot be assessed to pt rhythm of what appears to be afib,    Hyperlipidemia        CURRENT ALLERGIES: Nutritional supplements and Bee venom REVIEW OF SYSTEMS: 14 systems were reviewed. Pertinent positives and negatives as above, all else negative. Past Surgical History:   Procedure Laterality Date    ANKLE SURGERY      right ankle.  plate applied    COLONOSCOPY      COLONOSCOPY  2021    COLONOSCOPY N/A 2021    COLORECTAL CANCER SCREENING, NOT HIGH RISK performed by Hitesh Moe MD at [address] [place] [address] History:  Social History     Tobacco Use    Smoking status: Former     Packs/day: 0.50     Years: 20.00     Pack years: 10.00     Types: Cigarettes     Quit date: 1/10/1993     Years since quittin.8    Smokeless tobacco: Never   Substance Use Topics    Alcohol use: No    Drug use: No        CURRENT MEDICATIONS:  Outpatient Medications Marked as Taking for the 10/26/22 encounter (Office Visit) with Florentin Galeano MD   Medication Sig Dispense Refill    dilTIAZem (CARDIZEM CD) 120 MG extended release capsule Take 1 capsule by mouth daily 90 capsule 3 apixaban (ELIQUIS) 5 MG TABS tablet Take 1 tablet by mouth in the morning and 1 tablet before bedtime. 180 tablet 0    carvedilol (COREG) 25 MG tablet Take 1 tablet by mouth in the morning and 1 tablet before bedtime. 180 tablet 0    atorvastatin (LIPITOR) 40 MG tablet Take 1 tablet by mouth daily 90 tablet 3    sacubitril-valsartan (ENTRESTO) 49-51 MG per tablet Take 1 tablet by mouth 2 times daily 180 tablet 3       FAMILY HISTORY: family history includes Heart Disease in his mother; High Blood Pressure in his mother and sister; Other in his brother; Stroke in his mother. PHYSICAL EXAM:   /84 (Site: Left Upper Arm, Position: Sitting, Cuff Size: Medium Adult)   Pulse 50   Resp 17   Ht 5' 9.5\" (1.765 m)   Wt 209 lb (94.8 kg)   SpO2 96%   BMI 30.42 kg/m²  Body mass index is 30.42 kg/m². Constitutional: He is oriented to person, place, and time. He appears well-developed and well-nourished. In no acute distress. HEENT: Normocephalic and atraumatic. No JVD present. Carotid bruit is not present. No mass and no thyromegaly present. No lymphadenopathy present. Cardiovascular: Normal rate, irregularly irregular rhythm, normal heart sounds. Exam reveals no gallop and no friction rubs. A IV/VI systolic murmur was heard maximally at the apex. Pulmonary/Chest: Effort normal and breath sounds normal. No respiratory distress. He has no wheezes, rhonchi or rales. Abdominal: Soft, non-tender. Bowel sounds and aorta are normal. He exhibits no organomegaly, mass or bruit. Extremities:  No edema, cyanosis, or clubbing. Pulses are 2+ radial/carotid/dorsalis pedis and posterior tibial bilaterally. Neurological: He is alert and oriented to person, place, and time. No evidence of gross cranial nerve deficit. Coordination appeared normal.   Skin: Skin is warm and dry. There is no rash or diaphoresis. Psychiatric: He has a normal mood and affect.  His speech is normal and behavior is normal.      MOST RECENT LABS ON RECORD:   Lab Results   Component Value Date    WBC 6.6 05/04/2022    HGB 14.1 05/04/2022    HCT 42.6 05/04/2022     05/04/2022    CHOL 109 05/04/2022    TRIG 80 05/04/2022    HDL 30 (L) 05/04/2022    ALT 16 05/04/2022    AST 12 05/04/2022     05/04/2022    K 4.0 05/04/2022     05/04/2022    CREATININE 0.95 05/04/2022    BUN 14 05/04/2022    CO2 31 05/04/2022    TSH 1.12 04/24/2015    PSA 0.94 05/04/2022    INR 1.2 01/10/2013    LABA1C 5.4 02/11/2020     ASSESSMENT:  1. Encounter for current long-term use of anticoagulants    2. PAF (paroxysmal atrial fibrillation) ([address]    3. Bradycardia    4. Essential hypertension    5. Idiopathic cardiomyopathy (HCC)       PLAN:  Chronic Diastolic Heart Failure/ History of a Non Ischemic Cardiomyopathy, EF improved from 35% up to 55% on 4/26/2018 via echocardiogram. Currently appears very well without active signs of heart failure  Beta Blocker Therapy: Continue Carvedilol 25 mg twice daily. ACE Inibitor/ARB: Continue sacubitril/valsartan (Entresto) 49/51 mg every 12 hours     Nonpharmacologic management of Heart Failure:  I advised him to try and keep his legs up whenever possible and to limit salt in his diet. Chronic Atrial Fibrillation: Rate Control  Beta Blocker Therapy: Continue Carvedilol 25 mg twice daily I discussed the potential side effects of this medication including lightheadedness and dizziness and told him to call the office if this occurs. Calcium Channel Blocker: STOP diltiazem CD ( Cardizem CD) and START amlodipine (Norvasc) 10 mg once daily. I also discussed the potential side effects of this medication including lightheadedness and dizziness and instructed them to stop the medication of this occurs and call our office if this occurs.    [id number] Score for Atrial Fibrillation Stroke Risk   Risk   Factors  Component Value   C CHF Yes 1   H HTN Yes 1   A2 Age >= 75 No,  (78 y.o.) 0   D DM No 0   S2 Prior Stroke/TIA No 0 V Vascular Disease No 0   A Age 74-69 Yes,  (78 y.o.) 1   Sc Sex male 0    [id number]  Score  3   Score last updated 61/04/39 0:27 PM EDT  Click here for a link to the UpToDate guideline \"Atrial Fibrillation: Anticoagulation therapy to prevent embolization  Disclaimer: Risk Score calculation is dependent on accuracy of patient problem list and past encounter diagnosis. His CHADS2 score is 3/9(3.2% stroke risk  Anticoagulation: Continue Apixaban (Eliquis) 5 mg every 12 hours. I also reminded him to watch for signs of blood in his stool or black tarry stools and stop the medication immediately if this develops as this could be life threatening. Essential Hypertension: Controlled  ACE Inibitor/ARB: Continue sacubitril/valsartan (Entresto) 49/51 mg every 12 hours    Beta Blocker: Continue carvedilol (Coreg) 25 mg twice daily. Stop Hydralazine for simplification of his medications  Calcium Channel Blocker: START diltiazem CD (Cardizem CD) 120 mg once daily. I also discussed the potential side effects of this medication including lightheadedness and dizziness and instructed them to stop the medication of this occurs and call our office if this occurs. I did this to get better control of his HR  Stop hydralazine     Hyperlipidemia: Mixed LDL: 63 on 2/5/2021  Cholesterol Reduction Therapy: Continue Atorvastatin (Lipitor) 40 mg daily. Finally, I encouraged Mr. Hansne to continue to take his current medications and follow up with you as previously scheduled. FOLLOW UP:   I told Mr. Hansen to call my office if he had any problems, but otherwise told him to No follow-ups on file. However, I would be happy to see him sooner should the need arise. Once again, thank you for allowing me to participate in this patients care. Sincerely,  Juliet Wong MD, MS, F.A.C.C.   [facility] Specialist  [address], [facility], [address] [place] [address]  Phone: [phone], Fax: [phone]     I believe that the risk of significant morbidity and mortality related to the patient's current medical conditions are: Intermediate. The documentation recorded by the scribe, accurately and completely reflects the services I personally performed and the decisions made by me. Yaquelin Estrada MD, MS, F.A.C.C.  October 26, 2022

## 2022-10-26 NOTE — PATIENT INSTRUCTIONS
SURVEY:    You may be receiving a survey from [employer] regarding your visit today. Please complete the survey to enable us to provide the highest quality of care to you and your family. If you cannot score us a very good on any question, please call the office to discuss how we could have made your experience a very good one. Thank you.

## 2022-10-27 ENCOUNTER — TELEPHONE (OUTPATIENT)
Dept: CARDIOLOGY | Age: 67
End: 2022-10-27

## 2022-10-27 NOTE — TELEPHONE ENCOUNTER
----- Message from Yaquelin Estrada MD sent at 10/27/2022 12:23 AM EDT -----  Let Mr. Hansen know their test result was ok. Will discuss at next visit. Thanks.

## 2022-11-04 ENCOUNTER — OFFICE VISIT (OUTPATIENT)
Dept: PRIMARY CARE CLINIC | Age: 67
End: 2022-11-04
Payer: MEDICARE

## 2022-11-04 VITALS
RESPIRATION RATE: 16 BRPM | DIASTOLIC BLOOD PRESSURE: 82 MMHG | BODY MASS INDEX: 30.28 KG/M2 | SYSTOLIC BLOOD PRESSURE: 124 MMHG | WEIGHT: 208 LBS | HEART RATE: 86 BPM | OXYGEN SATURATION: 96 %

## 2022-11-04 DIAGNOSIS — I50.42 CHRONIC COMBINED SYSTOLIC AND DIASTOLIC CHF, NYHA CLASS 2 (HCC): ICD-10-CM

## 2022-11-04 DIAGNOSIS — I48.20 CHRONIC ATRIAL FIBRILLATION (HCC): Primary | ICD-10-CM

## 2022-11-04 DIAGNOSIS — I10 ESSENTIAL HYPERTENSION: ICD-10-CM

## 2022-11-04 PROCEDURE — 99213 OFFICE O/P EST LOW 20 MIN: CPT | Performed by: FAMILY MEDICINE

## 2022-11-04 PROCEDURE — 3074F SYST BP LT 130 MM HG: CPT | Performed by: FAMILY MEDICINE

## 2022-11-04 PROCEDURE — 1123F ACP DISCUSS/DSCN MKR DOCD: CPT | Performed by: FAMILY MEDICINE

## 2022-11-04 PROCEDURE — 3078F DIAST BP <80 MM HG: CPT | Performed by: FAMILY MEDICINE

## 2022-11-04 NOTE — PROGRESS NOTES
Hypertension: Patient here for follow-up of elevated blood pressure. He is not exercising and is adherent to low salt diet. Blood pressure is not well controlled at home. Cardiac symptoms none. Patient denies chest pain, fatigue, and palpitations. Cardiovascular risk factors: advanced age (older than 54 for men, 72 for women), hypertension, and male gender. Use of agents associated with hypertension: none. Congestive Heart Failure  Patient presents for re-evaluation of congestive heart failure. Patient's current complaints are none. He denies chest pain, fatigue, and palpitations. He states he is compliant all of the time with his medications. He states he is compliant most of the time with his diet. Atrial Fibrillation: Patient denies palpitations and shortness of breath at this time. Allergies:  Nutritional supplements and Bee venom    Past Medical History:    Past Medical History:   Diagnosis Date    Atrial fibrillation ([address]     Cardiomyopathy [facility])     Congestive heart failure, unspecified     History of echocardiogram 2018    EF >55%. LV wall thickness is moderately increased. LA is moderately dilated (34-39) w/ LA volume index of 34. Mild mitral regurg. Mild tricuspid regurg. Diastology cannot be assessed to pt rhythm of what appears to be afib,    Hyperlipidemia        Past Surgical History:    Past Surgical History:   Procedure Laterality Date    ANKLE SURGERY      right ankle.  plate applied    COLONOSCOPY      COLONOSCOPY  2021    COLONOSCOPY N/A 2021    COLORECTAL CANCER SCREENING, NOT HIGH RISK performed by Parisa Pierre MD at [address] [place] [address] History:   Social History     Tobacco Use    Smoking status: Former     Packs/day: 0.50     Years: 20.00     Pack years: 10.00     Types: Cigarettes     Quit date: 1/10/1993     Years since quittin.8    Smokeless tobacco: Never   Substance Use Topics    Alcohol use: No       Family History:   Family History   Problem Relation Age of Onset    High Blood Pressure Mother     Heart Disease Mother     Stroke Mother     High Blood Pressure Sister     Other Brother         valve replacement in heart          Review of Systems:  Constitutional: negative for fever or chills  Eyes: negative for visual disturbance   ENT: negative for sore throat or nasal congestion  Respiratory: neg for cough or shortness of breath  Cardiovascular: negative for chest pain or palpitations  Gastrointestinal: negative for abd pain, nausea, vomiting, diarrhea or constipation  Genitourinary: negative for dysuria, urgency or frequency  Integument/breast: negative for skin rash or lesions  Neurological: negative for unilateral weakness, numbness or tingling. Skeletal Muscular: no joint pain or swelling      Objective:  Physical Exam:  GEN:   A & O x3, no apparent distress  EYES: No gross abnormalities. ENT:ENT exam normal, no neck nodes or sinus tenderness  NECK: normal, supple, no lymphadenopathy,  no carotid bruits  PULM: clear to auscultation bilaterally- no wheezes, rales or rhonchi, normal air movement, no respiratory distress  COR: regular rate & rhythm, no murmurs, and no gallops  ABD:  soft, non-tender, non-distended, normal bowel sounds, no masses or organomegaly  : deferred  EXT: normal strength, tone, and muscle mass  NEURO: Motor and sensory grossly intact  SKIN:  No skin lesions or rashes        Labs:  Lab Results   Component Value Date    GLUCOSE 107 (H) 10/26/2022    BUN 12 10/26/2022    CREATININE 0.94 10/26/2022     10/26/2022    K 4.5 10/26/2022    CALCIUM 9.2 10/26/2022     10/26/2022    CO2 31 10/26/2022    LABGLOM >60 10/26/2022       Assessment:  1. Chronic atrial fibrillation ([address]    2. Chronic combined systolic and diastolic CHF, NYHA class 2 ([address]    3. Essential hypertension          Plan:  1. Chronic atrial fibrillation ([address]    2.  Chronic combined systolic and diastolic CHF, NYHA

## 2022-11-04 NOTE — PATIENT INSTRUCTIONS
SURVEY:    You may be receiving a survey from [employer] regarding your visit today. You may get this in the mail, through your MyChart, or in your email. Please complete the survey to enable us to provide the highest quality of care to you and your family. If you cannot score us a very good (5 Stars) on any question, please call the office to discuss how we could of made your experience exceptional.    Thank you!     Dr. Gallo Del Cid, [facility]IN[facility] [place], [address] [place] [address], [address], [address]    Phone: [phone]  Fax: [phone]    Office Hours:   Irina Ricci, [address], F: 8-5

## 2022-11-17 DIAGNOSIS — E78.2 MIXED HYPERLIPIDEMIA: ICD-10-CM

## 2022-11-17 DIAGNOSIS — I50.42 CHRONIC COMBINED SYSTOLIC AND DIASTOLIC CHF, NYHA CLASS 2 (HCC): ICD-10-CM

## 2022-11-17 DIAGNOSIS — I48.20 CHRONIC ATRIAL FIBRILLATION (HCC): ICD-10-CM

## 2022-11-17 DIAGNOSIS — I10 ESSENTIAL HYPERTENSION: ICD-10-CM

## 2022-11-17 DIAGNOSIS — I42.9 IDIOPATHIC CARDIOMYOPATHY (HCC): ICD-10-CM

## 2022-11-17 DIAGNOSIS — I48.20 CHRONIC A-FIB (HCC): ICD-10-CM

## 2022-11-17 RX ORDER — SACUBITRIL AND VALSARTAN 49; 51 MG/1; MG/1
1 TABLET, FILM COATED ORAL 2 TIMES DAILY
Qty: 180 TABLET | Refills: 3 | Status: SHIPPED | OUTPATIENT
Start: 2022-11-17

## 2023-01-05 DIAGNOSIS — I48.20 CHRONIC ATRIAL FIBRILLATION (HCC): ICD-10-CM

## 2023-01-05 DIAGNOSIS — E78.2 MIXED HYPERLIPIDEMIA: ICD-10-CM

## 2023-01-05 DIAGNOSIS — I10 ESSENTIAL HYPERTENSION: ICD-10-CM

## 2023-01-05 DIAGNOSIS — I48.20 CHRONIC A-FIB (HCC): ICD-10-CM

## 2023-01-05 DIAGNOSIS — I50.42 CHRONIC COMBINED SYSTOLIC AND DIASTOLIC CHF, NYHA CLASS 2 (HCC): ICD-10-CM

## 2023-01-05 DIAGNOSIS — I42.9 IDIOPATHIC CARDIOMYOPATHY (HCC): ICD-10-CM

## 2023-01-06 RX ORDER — ATORVASTATIN CALCIUM 40 MG/1
40 TABLET, FILM COATED ORAL DAILY
Qty: 90 TABLET | Refills: 3 | Status: SHIPPED | OUTPATIENT
Start: 2023-01-06

## 2023-02-10 ENCOUNTER — OFFICE VISIT (OUTPATIENT)
Dept: PRIMARY CARE CLINIC | Age: 68
End: 2023-02-10
Payer: MEDICARE

## 2023-02-10 VITALS
RESPIRATION RATE: 18 BRPM | HEIGHT: 70 IN | DIASTOLIC BLOOD PRESSURE: 82 MMHG | HEART RATE: 59 BPM | BODY MASS INDEX: 29.81 KG/M2 | SYSTOLIC BLOOD PRESSURE: 109 MMHG | OXYGEN SATURATION: 98 % | WEIGHT: 208.2 LBS

## 2023-02-10 DIAGNOSIS — I10 ESSENTIAL HYPERTENSION: Primary | ICD-10-CM

## 2023-02-10 DIAGNOSIS — I48.20 CHRONIC ATRIAL FIBRILLATION (HCC): ICD-10-CM

## 2023-02-10 DIAGNOSIS — I50.42 CHRONIC COMBINED SYSTOLIC AND DIASTOLIC CHF, NYHA CLASS 2 (HCC): ICD-10-CM

## 2023-02-10 PROCEDURE — 3074F SYST BP LT 130 MM HG: CPT | Performed by: FAMILY MEDICINE

## 2023-02-10 PROCEDURE — 3079F DIAST BP 80-89 MM HG: CPT | Performed by: FAMILY MEDICINE

## 2023-02-10 PROCEDURE — 99214 OFFICE O/P EST MOD 30 MIN: CPT | Performed by: FAMILY MEDICINE

## 2023-02-10 PROCEDURE — 1123F ACP DISCUSS/DSCN MKR DOCD: CPT | Performed by: FAMILY MEDICINE

## 2023-02-10 SDOH — ECONOMIC STABILITY: FOOD INSECURITY: WITHIN THE PAST 12 MONTHS, THE FOOD YOU BOUGHT JUST DIDN'T LAST AND YOU DIDN'T HAVE MONEY TO GET MORE.: NEVER TRUE

## 2023-02-10 SDOH — ECONOMIC STABILITY: FOOD INSECURITY: WITHIN THE PAST 12 MONTHS, YOU WORRIED THAT YOUR FOOD WOULD RUN OUT BEFORE YOU GOT MONEY TO BUY MORE.: NEVER TRUE

## 2023-02-10 SDOH — ECONOMIC STABILITY: INCOME INSECURITY: HOW HARD IS IT FOR YOU TO PAY FOR THE VERY BASICS LIKE FOOD, HOUSING, MEDICAL CARE, AND HEATING?: NOT HARD AT ALL

## 2023-02-10 SDOH — ECONOMIC STABILITY: HOUSING INSECURITY
IN THE LAST 12 MONTHS, WAS THERE A TIME WHEN YOU DID NOT HAVE A STEADY PLACE TO SLEEP OR SLEPT IN A SHELTER (INCLUDING NOW)?: NO

## 2023-02-10 ASSESSMENT — PATIENT HEALTH QUESTIONNAIRE - PHQ9
SUM OF ALL RESPONSES TO PHQ QUESTIONS 1-9: 0
2. FEELING DOWN, DEPRESSED OR HOPELESS: 0
SUM OF ALL RESPONSES TO PHQ9 QUESTIONS 1 & 2: 0
SUM OF ALL RESPONSES TO PHQ QUESTIONS 1-9: 0
1. LITTLE INTEREST OR PLEASURE IN DOING THINGS: 0
SUM OF ALL RESPONSES TO PHQ QUESTIONS 1-9: 0
SUM OF ALL RESPONSES TO PHQ QUESTIONS 1-9: 0

## 2023-02-10 NOTE — PROGRESS NOTES
Hypertension: Patient here for follow-up of elevated blood pressure. He is exercising and is adherent to low salt diet. Blood pressure is not well monitored at home. Cardiac symptoms none. Patient denies chest pain, dyspnea, fatigue, and palpitations. Cardiovascular risk factors: advanced age (older than 54 for men, 72 for women), dyslipidemia, hypertension, and male gender. Use of agents associated with hypertension: none. Hyperlipidemia: Patient presents with hyperlipidemia. There is a family history of hyperlipidemia. Patient reports he is doing well, no issues or concerns. Allergies:  Nutritional supplements and Bee venom    Past Medical History:    Past Medical History:   Diagnosis Date    Atrial fibrillation ([facility]     Cardiomyopathy [facility])     Congestive heart failure, unspecified     History of echocardiogram 2018    EF >55%. LV wall thickness is moderately increased. LA is moderately dilated (34-39) w/ LA volume index of 34. Mild mitral regurg. Mild tricuspid regurg. Diastology cannot be assessed to pt rhythm of what appears to be afib,    Hyperlipidemia        Past Surgical History:    Past Surgical History:   Procedure Laterality Date    ANKLE SURGERY      right ankle.  plate applied    COLONOSCOPY      COLONOSCOPY  2021    COLONOSCOPY N/A 2021    COLORECTAL CANCER SCREENING, NOT HIGH RISK performed by Tarah Houston MD at [address] [place] [address] History:   Social History     Tobacco Use    Smoking status: Former     Packs/day: 0.50     Years: 20.00     Pack years: 10.00     Types: Cigarettes     Quit date: 1/10/1993     Years since quittin.1    Smokeless tobacco: Never   Substance Use Topics    Alcohol use: No       Family History:   Family History   Problem Relation Age of Onset    High Blood Pressure Mother     Heart Disease Mother     Stroke Mother     High Blood Pressure Sister     Other Brother         valve replacement in heart Review of Systems:  Constitutional: negative for fevers or chills  Eyes: negative for visual disturbance   ENT: negative for sore throat or nasal congestion  Respiratory: no cough or shortness of breath  Cardiovascular: negative for chest pain or palpitations  Gastrointestinal: negative for abd pain, nausea, vomiting, diarrhea or constipation  Genitourinary: negative for dysuria, urgency or frequency  Integument/breast: negative for skin rash or lesions  Neurological: negative for unilateral weakness, numbness or tingling. Skeletal Muscular: no joint pain     Medication List            Accurate as of February 10, 2023  1:28 PM. If you have any questions, ask your nurse or doctor. CHANGE how you take these medications      amLODIPine 5 MG tablet  Commonly known as: NORVASC  Take 1 tablet by mouth daily  What changed: how much to take            CONTINUE taking these medications      apixaban 5 MG Tabs tablet  Commonly known as: Eliquis  Take 1 tablet by mouth 2 times daily     atorvastatin 40 MG tablet  Commonly known as: LIPITOR  Take 1 tablet by mouth daily     carvedilol 25 MG tablet  Commonly known as: COREG  Take 1 tablet by mouth 2 times daily     Entresto 49-51 MG per tablet  Generic drug: sacubitril-valsartan  Take 1 tablet by mouth 2 times daily               Where to Get Your Medications        Information about where to get these medications is not yet available    Ask your nurse or doctor about these medications  apixaban 5 MG Tabs tablet         Objective:  Physical Exam:  VitalsBP 109/82 (Site: Left Upper Arm, Position: Sitting, Cuff Size: Large Adult)   Pulse 59   Resp 18   Ht 5' 9.5\" (1.765 m)   Wt 208 lb 3.2 oz (94.4 kg)   SpO2 98%   BMI 30.30 kg/m²   GEN:   A & O x3, no apparent distress  EYES: No gross abnormalities.  and PERRL  ENT:ENT exam normal, no neck nodes or sinus tenderness  NECK: normal, supple, no lymphadenopathy,  no carotid bruits  PULM: clear to auscultation bilaterally- no wheezes, rales or rhonchi, normal air movement, no respiratory distress  COR: irregularly irregular and 2/6 murmer,gallop  ABD:  soft, non-tender  : deferred  EXT: normal strength, tone, and muscle mass  NEURO: Motor and sensory grossly intact  SKIN:  No skin lesions or rashes      Labs:  Lab Results   Component Value Date    BUN 12 10/26/2022    CREATININE 0.94 10/26/2022     10/26/2022    K 4.5 10/26/2022    CALCIUM 9.2 10/26/2022     10/26/2022    CO2 31 10/26/2022    LABGLOM >60 10/26/2022    CHOL 109 05/04/2022    LDLCHOLESTEROL 63 05/04/2022    HDL 30 (L) 05/04/2022    TRIG 80 05/04/2022    ALT 16 05/04/2022    AST 12 05/04/2022       Assessment:  1. Essential hypertension    2. Chronic combined systolic and diastolic CHF, NYHA class 2 ([facility]    3. Chronic atrial fibrillation [facility])      Patient Active Problem List   Diagnosis Code    Essential hypertension I10    Congestive heart failure (HCC) I50.9    Chronic atrial fibrillation (HCC) I48.20    Hyperlipidemia E78.5    Idiopathic cardiomyopathy (HCC) I42.9    Chronic combined systolic and diastolic CHF, NYHA class 2 (HCC) I50.42    Positive FIT (fecal immunochemical test) R19.5       Plan:  1. Essential hypertension - bp lower,decrease norvasc to 2.5 and monitor   2. Chronic combined systolic and diastolic CHF, NYHA class 2 (HCC) - stable on entresto   3. Chronic atrial fibrillation (HCC) - rate well controlled with coreg       Decrease norvasc to 2.5 mg  Encouraged low sodium, low cholesterol, weight loss diet. Goal for blood pressure controll is 130/80  Recommended regular exercise as tolerated, 3-5 times per day  Labs: fasting lipids, ALT, AST and BMP in 6 months  Follow up in 3 months      No orders of the defined types were placed in this encounter.       Current Outpatient Medications   Medication Sig Dispense Refill    apixaban (ELIQUIS) 5 MG TABS tablet Take 1 tablet by mouth 2 times daily 180 tablet 0 atorvastatin (LIPITOR) 40 MG tablet Take 1 tablet by mouth daily 90 tablet 3    sacubitril-valsartan (ENTRESTO) 49-51 MG per tablet Take 1 tablet by mouth 2 times daily 180 tablet 3    amLODIPine (NORVASC) 5 MG tablet Take 1 tablet by mouth daily (Patient taking differently: Take 2.5 mg by mouth daily) 90 tablet 3    carvedilol (COREG) 25 MG tablet Take 1 tablet by mouth 2 times daily 180 tablet 3     No current facility-administered medications for this visit. No follow-ups on file.       Electronically signed by Dori Hatch MD on 2/10/2023 at 1:28 PM

## 2023-05-15 ENCOUNTER — HOSPITAL ENCOUNTER (OUTPATIENT)
Age: 68
Discharge: HOME | End: 2023-05-15
Payer: MEDICARE

## 2023-05-15 ENCOUNTER — OFFICE VISIT (OUTPATIENT)
Dept: PRIMARY CARE CLINIC | Age: 68
End: 2023-05-15
Payer: MEDICARE

## 2023-05-15 VITALS
WEIGHT: 208 LBS | HEART RATE: 64 BPM | BODY MASS INDEX: 30.28 KG/M2 | RESPIRATION RATE: 16 BRPM | OXYGEN SATURATION: 98 % | SYSTOLIC BLOOD PRESSURE: 122 MMHG | DIASTOLIC BLOOD PRESSURE: 72 MMHG

## 2023-05-15 DIAGNOSIS — I10 ESSENTIAL HYPERTENSION: Primary | ICD-10-CM

## 2023-05-15 DIAGNOSIS — I48.20 CHRONIC ATRIAL FIBRILLATION (HCC): ICD-10-CM

## 2023-05-15 DIAGNOSIS — Z12.5 SCREENING PSA (PROSTATE SPECIFIC ANTIGEN): ICD-10-CM

## 2023-05-15 DIAGNOSIS — E78.2 MIXED HYPERLIPIDEMIA: ICD-10-CM

## 2023-05-15 DIAGNOSIS — Z13.1 ENCOUNTER FOR SCREENING FOR DIABETES MELLITUS: ICD-10-CM

## 2023-05-15 DIAGNOSIS — I10 ESSENTIAL HYPERTENSION: ICD-10-CM

## 2023-05-15 DIAGNOSIS — I50.42 CHRONIC COMBINED SYSTOLIC AND DIASTOLIC CHF, NYHA CLASS 2 (HCC): ICD-10-CM

## 2023-05-15 LAB
ALBUMIN SERPL-MCNC: 4.4 G/DL (ref 3.5–5.2)
ALBUMIN/GLOB SERPL: 1.8 {RATIO} (ref 1–2.5)
ALP SERPL-CCNC: 93 U/L (ref 40–129)
ALT SERPL-CCNC: 14 U/L (ref 5–41)
ANION GAP SERPL CALCULATED.3IONS-SCNC: 6 MMOL/L (ref 9–17)
AST SERPL-CCNC: 14 U/L
BASOPHILS # BLD: 0.05 K/UL (ref 0–0.2)
BASOPHILS # BLD: 1 % (ref 0–2)
BILIRUB SERPL-MCNC: 1.1 MG/DL (ref 0.3–1.2)
BUN SERPL-MCNC: 15 MG/DL (ref 8–23)
BUN/CREAT BLD: 16 (ref 9–20)
CALCIUM SERPL-MCNC: 9.4 MG/DL (ref 8.6–10.4)
CHLORIDE SERPL-SCNC: 102 MMOL/L (ref 98–107)
CHOLEST SERPL-MCNC: 119 MG/DL
CHOLESTEROL/HDL RATIO: 3.8
CO2 SERPL-SCNC: 33 MMOL/L (ref 20–31)
CREAT SERPL-MCNC: 0.91 MG/DL (ref 0.7–1.2)
EOSINOPHIL # BLD: 0.16 K/UL (ref 0–0.44)
EOSINOPHILS RELATIVE PERCENT: 3 % (ref 1–4)
ERYTHROCYTE [DISTWIDTH] IN BLOOD BY AUTOMATED COUNT: 12.7 % (ref 11.8–14.4)
GFR SERPL CREATININE-BSD FRML MDRD: >60 ML/MIN/1.73M2
GLUCOSE SERPL-MCNC: 113 MG/DL (ref 70–99)
HCT VFR BLD AUTO: 44.3 % (ref 40.7–50.3)
HDLC SERPL-MCNC: 31 MG/DL
HGB BLD-MCNC: 14.6 G/DL (ref 13–17)
IMM GRANULOCYTES # BLD AUTO: <0.03 K/UL (ref 0–0.3)
IMM GRANULOCYTES NFR BLD: 0 %
LDLC SERPL CALC-MCNC: 66 MG/DL (ref 0–130)
LYMPHOCYTES # BLD: 27 % (ref 24–43)
LYMPHOCYTES NFR BLD: 1.63 K/UL (ref 1.1–3.7)
MCH RBC QN AUTO: 29 PG (ref 25.2–33.5)
MCHC RBC AUTO-ENTMCNC: 33 G/DL (ref 28.4–34.8)
MCV RBC AUTO: 88.1 FL (ref 82.6–102.9)
MONOCYTES NFR BLD: 0.55 K/UL (ref 0.1–1.2)
MONOCYTES NFR BLD: 9 % (ref 3–12)
NEUTROPHILS NFR BLD: 60 % (ref 36–65)
NEUTS SEG NFR BLD: 3.59 K/UL (ref 1.5–8.1)
NRBC AUTOMATED: 0 PER 100 WBC
PLATELET # BLD AUTO: 176 K/UL (ref 138–453)
PMV BLD AUTO: 11.6 FL (ref 8.1–13.5)
POTASSIUM SERPL-SCNC: 4.7 MMOL/L (ref 3.7–5.3)
PROT SERPL-MCNC: 6.8 G/DL (ref 6.4–8.3)
RBC # BLD AUTO: 5.03 M/UL (ref 4.21–5.77)
SODIUM SERPL-SCNC: 141 MMOL/L (ref 135–144)
TRIGL SERPL-MCNC: 110 MG/DL
WBC OTHER # BLD: 6 K/UL (ref 3.5–11.3)

## 2023-05-15 PROCEDURE — 99214 OFFICE O/P EST MOD 30 MIN: CPT | Performed by: STUDENT IN AN ORGANIZED HEALTH CARE EDUCATION/TRAINING PROGRAM

## 2023-05-15 PROCEDURE — 80053 COMPREHEN METABOLIC PANEL: CPT

## 2023-05-15 PROCEDURE — 3078F DIAST BP <80 MM HG: CPT | Performed by: STUDENT IN AN ORGANIZED HEALTH CARE EDUCATION/TRAINING PROGRAM

## 2023-05-15 PROCEDURE — 36415 COLL VENOUS BLD VENIPUNCTURE: CPT

## 2023-05-15 PROCEDURE — 85025 COMPLETE CBC W/AUTO DIFF WBC: CPT

## 2023-05-15 PROCEDURE — 80061 LIPID PANEL: CPT

## 2023-05-15 PROCEDURE — 1123F ACP DISCUSS/DSCN MKR DOCD: CPT | Performed by: STUDENT IN AN ORGANIZED HEALTH CARE EDUCATION/TRAINING PROGRAM

## 2023-05-15 PROCEDURE — 3074F SYST BP LT 130 MM HG: CPT | Performed by: STUDENT IN AN ORGANIZED HEALTH CARE EDUCATION/TRAINING PROGRAM

## 2023-05-15 PROCEDURE — G0103 PSA SCREENING: HCPCS

## 2023-05-15 NOTE — PROGRESS NOTES
[address], [address] , [facility], [address]    Humaira Altamirano is a 76 y.o. male with  has a past medical history of Atrial fibrillation ([facility] Cardiomyopathy ([facility] Congestive heart failure, unspecified, History of echocardiogram, and Hyperlipidemia. Presented to the office today for:  Chief Complaint   Patient presents with    Hypertension    Hyperlipidemia    Congestive Heart Failure    Atrial Fibrillation    Established New Doctor       Assessment/Plan   1. Essential hypertension  -     CBC with Auto Differential; Future  -     Comprehensive Metabolic Panel; Future  2. Chronic combined systolic and diastolic CHF, NYHA class 2 ([facility]  3. Chronic atrial fibrillation (HCC)  -     apixaban (ELIQUIS) 5 MG TABS tablet; Take 1 tablet by mouth 2 times daily, Disp-180 tablet, R-0Normal  4. Mixed hyperlipidemia  -     Lipid Panel; Future  5. Screening PSA (prostate specific antigen)  -     PSA Screening; Future  6. Encounter for screening for diabetes mellitus  -     Glucose, Fasting [[id number]]; Future    Return in about 3 months (around 8/15/2023) for F/U Meds/HTN, book AWV when due. HTN/CHF/Afib - stable and continue w/ meds at the current doses, continue w/ Eliquis at the current dose, well tolerated, H/H stable  Hyperlipidemia - continue w/ statin at the current dose, lipitor  Obtain labs for monitoring. All patient questions answered. Pt voiced understanding. Medications Discontinued During This Encounter   Medication Reason    apixaban (ELIQUIS) 5 MG TABS tablet REORDER       Patient received counseling on the following healthy behaviors: nutrition, exercise and medication adherence. I encouraged and discussed lifestyle modifications including diet and exercise and the patient was agreeable to making positive/beneficial changes to both to help improve their overall health. Discussed use, benefit, and side effects of prescribed medications.   Barriers to

## 2023-05-15 NOTE — PATIENT INSTRUCTIONS
SURVEY:    You may be receiving a survey from [employer] regarding your visit today. Please complete the survey to enable us to provide the highest quality of care to you and your family. If you cannot score us a very good on any question, please call the office to discuss how we could have made your experience a very good one. Thank you. None known

## 2023-05-16 LAB — PSA SERPL-MCNC: 1.41 NG/ML

## 2023-06-14 PROBLEM — Z13.1 ENCOUNTER FOR SCREENING FOR DIABETES MELLITUS: Status: RESOLVED | Noted: 2023-05-15 | Resolved: 2023-06-14

## 2023-06-14 PROBLEM — Z12.5 SCREENING PSA (PROSTATE SPECIFIC ANTIGEN): Status: RESOLVED | Noted: 2023-05-15 | Resolved: 2023-06-14

## 2023-06-19 ENCOUNTER — HOSPITAL ENCOUNTER (OUTPATIENT)
Age: 68
Discharge: HOME | End: 2023-06-19
Payer: MEDICARE

## 2023-06-19 DIAGNOSIS — R73.9 HYPERGLYCEMIA: ICD-10-CM

## 2023-06-19 PROCEDURE — 83036 HEMOGLOBIN GLYCOSYLATED A1C: CPT

## 2023-06-19 PROCEDURE — 36415 COLL VENOUS BLD VENIPUNCTURE: CPT

## 2023-06-20 LAB
EST. AVERAGE GLUCOSE BLD GHB EST-MCNC: 105 MG/DL
HBA1C MFR BLD: 5.3 % (ref 4–6)

## 2023-08-11 DIAGNOSIS — I48.20 CHRONIC ATRIAL FIBRILLATION (HCC): ICD-10-CM

## 2023-08-17 ENCOUNTER — OFFICE VISIT (OUTPATIENT)
Dept: PRIMARY CARE CLINIC | Age: 68
End: 2023-08-17
Payer: MEDICARE

## 2023-08-17 VITALS
DIASTOLIC BLOOD PRESSURE: 68 MMHG | SYSTOLIC BLOOD PRESSURE: 122 MMHG | OXYGEN SATURATION: 97 % | RESPIRATION RATE: 16 BRPM | WEIGHT: 203 LBS | BODY MASS INDEX: 30.07 KG/M2 | HEIGHT: 69 IN | HEART RATE: 78 BPM

## 2023-08-17 DIAGNOSIS — I10 ESSENTIAL HYPERTENSION: ICD-10-CM

## 2023-08-17 DIAGNOSIS — I50.42 CHRONIC COMBINED SYSTOLIC AND DIASTOLIC CHF, NYHA CLASS 2 (HCC): ICD-10-CM

## 2023-08-17 DIAGNOSIS — I48.20 CHRONIC ATRIAL FIBRILLATION (HCC): ICD-10-CM

## 2023-08-17 DIAGNOSIS — I42.9 IDIOPATHIC CARDIOMYOPATHY (HCC): ICD-10-CM

## 2023-08-17 DIAGNOSIS — I48.20 CHRONIC A-FIB (HCC): ICD-10-CM

## 2023-08-17 DIAGNOSIS — E78.2 MIXED HYPERLIPIDEMIA: ICD-10-CM

## 2023-08-17 DIAGNOSIS — Z00.00 MEDICARE ANNUAL WELLNESS VISIT, SUBSEQUENT: Primary | ICD-10-CM

## 2023-08-17 PROCEDURE — G0439 PPPS, SUBSEQ VISIT: HCPCS | Performed by: STUDENT IN AN ORGANIZED HEALTH CARE EDUCATION/TRAINING PROGRAM

## 2023-08-17 PROCEDURE — 1123F ACP DISCUSS/DSCN MKR DOCD: CPT | Performed by: STUDENT IN AN ORGANIZED HEALTH CARE EDUCATION/TRAINING PROGRAM

## 2023-08-17 PROCEDURE — 3074F SYST BP LT 130 MM HG: CPT | Performed by: STUDENT IN AN ORGANIZED HEALTH CARE EDUCATION/TRAINING PROGRAM

## 2023-08-17 PROCEDURE — 3078F DIAST BP <80 MM HG: CPT | Performed by: STUDENT IN AN ORGANIZED HEALTH CARE EDUCATION/TRAINING PROGRAM

## 2023-08-17 RX ORDER — SACUBITRIL AND VALSARTAN 49; 51 MG/1; MG/1
1 TABLET, FILM COATED ORAL 2 TIMES DAILY
Qty: 180 TABLET | Refills: 3 | Status: SHIPPED | OUTPATIENT
Start: 2023-08-17

## 2023-08-17 ASSESSMENT — PATIENT HEALTH QUESTIONNAIRE - PHQ9
2. FEELING DOWN, DEPRESSED OR HOPELESS: 0
SUM OF ALL RESPONSES TO PHQ QUESTIONS 1-9: 0
1. LITTLE INTEREST OR PLEASURE IN DOING THINGS: 0
SUM OF ALL RESPONSES TO PHQ9 QUESTIONS 1 & 2: 0
SUM OF ALL RESPONSES TO PHQ QUESTIONS 1-9: 0

## 2023-08-17 ASSESSMENT — LIFESTYLE VARIABLES
HOW MANY STANDARD DRINKS CONTAINING ALCOHOL DO YOU HAVE ON A TYPICAL DAY: PATIENT DOES NOT DRINK
HOW OFTEN DO YOU HAVE A DRINK CONTAINING ALCOHOL: NEVER

## 2023-08-17 NOTE — PROGRESS NOTES
[address], [address] [place] [address], [phone]      Medicare Annual Wellness Visit    Viky Garcia is here for Medicare AWV    Assessment & Plan   Medicare annual wellness visit, subsequent  Chronic combined systolic and diastolic CHF, NYHA class 2 (HCC)  -     sacubitril-valsartan (ENTRESTO) 49-51 MG per tablet; Take 1 tablet by mouth 2 times daily, Disp-180 tablet, R-3Normal  Idiopathic cardiomyopathy (HCC)  -     sacubitril-valsartan (ENTRESTO) 49-51 MG per tablet; Take 1 tablet by mouth 2 times daily, Disp-180 tablet, R-3Normal  Chronic atrial fibrillation (HCC)  -     sacubitril-valsartan (ENTRESTO) 49-51 MG per tablet; Take 1 tablet by mouth 2 times daily, Disp-180 tablet, R-3Normal  Essential hypertension  -     sacubitril-valsartan (ENTRESTO) 49-51 MG per tablet; Take 1 tablet by mouth 2 times daily, Disp-180 tablet, R-3Normal  Mixed hyperlipidemia  -     sacubitril-valsartan (ENTRESTO) 49-51 MG per tablet; Take 1 tablet by mouth 2 times daily, Disp-180 tablet, R-3Normal  Chronic a-fib (HCC)  -     sacubitril-valsartan (ENTRESTO) 49-51 MG per tablet; Take 1 tablet by mouth 2 times daily, Disp-180 tablet, R-3Normal    Recommendations for Preventive Services Due: see orders and patient instructions/AVS.  Recommended screening schedule for the next 5-10 years is provided to the patient in written form: see Patient Instructions/AVS.    Continue w/ meds at the current doses  Continue f/u with Cardiology per prior plans  The patient will bring it in Living Will/HCAP. Return in 3 months (on 11/17/2023) for F/U Meds. Subjective   The following acute and/or chronic problems were also addressed today:    Hypertension: Patient here for follow-up of elevated blood pressure. He is exercising and is adherent to low salt diet. Blood pressure is well controlled at home. Cardiac symptoms none.  Patient denies chest pain, chest pressure/discomfort, claudication,

## 2023-09-16 PROBLEM — Z00.00 MEDICARE ANNUAL WELLNESS VISIT, SUBSEQUENT: Status: RESOLVED | Noted: 2023-08-17 | Resolved: 2023-09-16

## 2023-10-27 RX ORDER — CARVEDILOL 25 MG/1
25 TABLET ORAL 2 TIMES DAILY
Qty: 180 TABLET | Refills: 0 | Status: SHIPPED | OUTPATIENT
Start: 2023-10-27

## 2023-10-30 ENCOUNTER — OFFICE VISIT (OUTPATIENT)
Dept: CARDIOLOGY | Age: 68
End: 2023-10-30
Payer: MEDICARE

## 2023-10-30 VITALS
DIASTOLIC BLOOD PRESSURE: 72 MMHG | BODY MASS INDEX: 30.96 KG/M2 | HEIGHT: 69 IN | WEIGHT: 209 LBS | HEART RATE: 63 BPM | OXYGEN SATURATION: 97 % | SYSTOLIC BLOOD PRESSURE: 104 MMHG | RESPIRATION RATE: 18 BRPM

## 2023-10-30 DIAGNOSIS — I48.20 CHRONIC ATRIAL FIBRILLATION (HCC): ICD-10-CM

## 2023-10-30 DIAGNOSIS — I50.42 CHRONIC COMBINED SYSTOLIC AND DIASTOLIC CHF, NYHA CLASS 2 (HCC): ICD-10-CM

## 2023-10-30 DIAGNOSIS — I10 ESSENTIAL HYPERTENSION: ICD-10-CM

## 2023-10-30 DIAGNOSIS — Z79.01 ENCOUNTER FOR CURRENT LONG-TERM USE OF ANTICOAGULANTS: ICD-10-CM

## 2023-10-30 DIAGNOSIS — I48.20 CHRONIC A-FIB (HCC): ICD-10-CM

## 2023-10-30 DIAGNOSIS — I42.9 IDIOPATHIC CARDIOMYOPATHY (HCC): ICD-10-CM

## 2023-10-30 DIAGNOSIS — E78.2 MIXED HYPERLIPIDEMIA: ICD-10-CM

## 2023-10-30 DIAGNOSIS — R06.02 SHORTNESS OF BREATH ON EXERTION: Primary | ICD-10-CM

## 2023-10-30 PROCEDURE — 3074F SYST BP LT 130 MM HG: CPT | Performed by: FAMILY MEDICINE

## 2023-10-30 PROCEDURE — 99214 OFFICE O/P EST MOD 30 MIN: CPT | Performed by: FAMILY MEDICINE

## 2023-10-30 PROCEDURE — 3078F DIAST BP <80 MM HG: CPT | Performed by: FAMILY MEDICINE

## 2023-10-30 PROCEDURE — 93000 ELECTROCARDIOGRAM COMPLETE: CPT | Performed by: FAMILY MEDICINE

## 2023-10-30 PROCEDURE — 1123F ACP DISCUSS/DSCN MKR DOCD: CPT | Performed by: FAMILY MEDICINE

## 2023-10-30 RX ORDER — SACUBITRIL AND VALSARTAN 49; 51 MG/1; MG/1
1 TABLET, FILM COATED ORAL 2 TIMES DAILY
Qty: 180 TABLET | Refills: 3 | Status: SHIPPED | OUTPATIENT
Start: 2023-10-30

## 2023-10-30 RX ORDER — AMLODIPINE BESYLATE 5 MG/1
5 TABLET ORAL DAILY
Qty: 90 TABLET | Refills: 3 | Status: SHIPPED | OUTPATIENT
Start: 2023-10-30

## 2023-10-30 RX ORDER — AMOXICILLIN 875 MG/1
TABLET, COATED ORAL
COMMUNITY
Start: 2023-10-26

## 2023-10-30 NOTE — PATIENT INSTRUCTIONS
SURVEY:    You may be receiving a survey from [employer] regarding your visit today. Please complete the survey to enable us to provide the highest quality of care to you and your family. If you cannot score us a very good on any question, please call the office to discuss how we could have made your experience a very good one. Thank you.

## 2023-10-30 NOTE — PROGRESS NOTES
Adam Samuel am scribing for and in the presence of Sonia Sarabia. Judith LEOS, MS, F.A.C.C. Patient: Conner Montemayor  : 1955  Date of Visit: 2023    REASON FOR VISIT / CONSULTATION: Follow-up (HX: PAF, jena, HTN, idiopathic cardio. Pt is here for a yearly follow up. Pt reports he is doing well. Denies palps, CP, SOB, light headed/dizziness. )    Dear Patience Viera MD,     I had the pleasure of seeing your patient Conner Montemayor in consultation today. As you know, Mr. Caryle Creed is a 76 y.o. male with a history of what sounds to be a non-ischemic cardiomyopathy back in  most likely secondary to a rate related cardiomyopathy due to atrial fibrillation. At that same time, he says he thinks he was diagnosed with blood clots in his lungs but denies any known issues or repeat problems since then. At that time his EF was apparently \"severely reduced\" but he is unsure of whether his heart ever got stronger or not. His EF in  was 35-40% on echocardiography. In , he was started on Entresto for his non ischemic cardiomyopathy. Echocardiogram done on 2018 showed an improved ejection fraction from 35-40% up to >55%. Echocardiogram done on 2021: EF of >55% Moderately increased LV wall thickness. Left atrium mildly dilated. Mild mitral regurgitation. Mild to moderate tricuspid regurgitation. Evidence of mild diastolic dysfunction is seen. EKG done in office 10/30/23: Atrial Fibrillation. Mr. Caryle Creed is here for a yearly follow up. He reports he is doing well. He denies any palpitations, light headed/dizziness or any shortness of breath. He denies any chest pain, pressure or tightness. No abdominal pain, bleeding problems, problems with his medications or any other concerns at this time. No cough, fever or chills. No nausea or vomiting.      Bleeding Risks: Mr. Caryle Creed denies any current or recent bleeding problems including a history of a GI bleed, ulcers, recent or upcoming surgeries,

## 2023-11-17 ENCOUNTER — OFFICE VISIT (OUTPATIENT)
Dept: PRIMARY CARE CLINIC | Age: 68
End: 2023-11-17
Payer: MEDICARE

## 2023-11-17 VITALS
OXYGEN SATURATION: 98 % | WEIGHT: 207 LBS | DIASTOLIC BLOOD PRESSURE: 64 MMHG | SYSTOLIC BLOOD PRESSURE: 116 MMHG | BODY MASS INDEX: 30.66 KG/M2 | HEART RATE: 69 BPM | HEIGHT: 69 IN

## 2023-11-17 DIAGNOSIS — I10 ESSENTIAL HYPERTENSION: Primary | ICD-10-CM

## 2023-11-17 DIAGNOSIS — Z23 FLU VACCINE NEED: ICD-10-CM

## 2023-11-17 DIAGNOSIS — E78.2 MIXED HYPERLIPIDEMIA: ICD-10-CM

## 2023-11-17 PROCEDURE — 3078F DIAST BP <80 MM HG: CPT | Performed by: STUDENT IN AN ORGANIZED HEALTH CARE EDUCATION/TRAINING PROGRAM

## 2023-11-17 PROCEDURE — 3074F SYST BP LT 130 MM HG: CPT | Performed by: STUDENT IN AN ORGANIZED HEALTH CARE EDUCATION/TRAINING PROGRAM

## 2023-11-17 PROCEDURE — 99214 OFFICE O/P EST MOD 30 MIN: CPT | Performed by: STUDENT IN AN ORGANIZED HEALTH CARE EDUCATION/TRAINING PROGRAM

## 2023-11-17 PROCEDURE — G0008 ADMIN INFLUENZA VIRUS VAC: HCPCS | Performed by: STUDENT IN AN ORGANIZED HEALTH CARE EDUCATION/TRAINING PROGRAM

## 2023-11-17 PROCEDURE — 90694 VACC AIIV4 NO PRSRV 0.5ML IM: CPT | Performed by: STUDENT IN AN ORGANIZED HEALTH CARE EDUCATION/TRAINING PROGRAM

## 2023-11-17 PROCEDURE — 1123F ACP DISCUSS/DSCN MKR DOCD: CPT | Performed by: STUDENT IN AN ORGANIZED HEALTH CARE EDUCATION/TRAINING PROGRAM

## 2023-11-17 NOTE — PROGRESS NOTES
[address], [address] [place], [place], [address]    Maria L Page is a 76 y.o. male with  has a past medical history of Atrial fibrillation ([facility]), Cardiomyopathy ([facility]), Congestive heart failure, unspecified, History of echocardiogram, and Hyperlipidemia. Presented to the office today for:  Chief Complaint   Patient presents with    Hypertension    Hyperlipidemia       Assessment/Plan   1. Essential hypertension  2. Mixed hyperlipidemia  3. Flu vaccine need  -     Influenza, FLUAD, (age 72 y+), IM, PF, 0.5 mL  -     CA IM ADM PRQ ID SUBQ/IM NJXS 1 VACCINE    Return in about 3 months (around 2/17/2024) for F/U Meds/HTN.    HTN/CHF/Afib - stable and continue w/ meds at the current doses, continue w/ Eliquis at the current dose, well tolerated, H/H stable  ECHO pending  Hyperlipidemia - continue w/ statin at the current dose, lipitor  F/U with Dentist per prior plans for extraction     All patient questions answered. Pt voiced understanding. Medications Discontinued During This Encounter   Medication Reason    amoxicillin (AMOXIL) 875 MG tablet ERROR       Patient received counseling on the following healthy behaviors: nutrition, exercise and medication adherence. I encouraged and discussed lifestyle modifications including diet and exercise and the patient was agreeable to making positive/beneficial changes to both to help improve their overall health. Discussed use, benefit, and side effects of prescribed medications. Barriers to medication compliance addressed. Patient given educational materials: see patient instructions. HM - HM items completed today as per orders. Outstanding HM items though not limited to immunizations were discussed with the patient today, including risks, benefits and alternatives. The patient will discuss these during the next appointment per their preference.  If there are any worsening or concerning signs or symptoms, patient will report

## 2023-12-01 ENCOUNTER — HOSPITAL ENCOUNTER (OUTPATIENT)
Age: 68
End: 2023-12-01
Attending: FAMILY MEDICINE
Payer: MEDICARE

## 2023-12-01 VITALS
BODY MASS INDEX: 30.66 KG/M2 | HEIGHT: 69 IN | DIASTOLIC BLOOD PRESSURE: 64 MMHG | WEIGHT: 207.01 LBS | SYSTOLIC BLOOD PRESSURE: 116 MMHG

## 2023-12-01 DIAGNOSIS — I48.20 CHRONIC ATRIAL FIBRILLATION (HCC): ICD-10-CM

## 2023-12-01 DIAGNOSIS — I42.9 IDIOPATHIC CARDIOMYOPATHY (HCC): ICD-10-CM

## 2023-12-01 DIAGNOSIS — I50.42 CHRONIC COMBINED SYSTOLIC AND DIASTOLIC CHF, NYHA CLASS 2 (HCC): ICD-10-CM

## 2023-12-01 DIAGNOSIS — E78.2 MIXED HYPERLIPIDEMIA: ICD-10-CM

## 2023-12-01 DIAGNOSIS — R06.02 SHORTNESS OF BREATH ON EXERTION: ICD-10-CM

## 2023-12-01 DIAGNOSIS — I48.20 CHRONIC A-FIB (HCC): ICD-10-CM

## 2023-12-01 DIAGNOSIS — I10 ESSENTIAL HYPERTENSION: ICD-10-CM

## 2023-12-01 PROCEDURE — 93306 TTE W/DOPPLER COMPLETE: CPT

## 2023-12-02 LAB
ECHO AO ROOT DIAM: 3.4 CM
ECHO AO ROOT INDEX: 1.62 CM/M2
ECHO AV ACCELERATION TIME: 75.51 MS
ECHO AV CUSP MM: 1.7 CM
ECHO AV MEAN GRADIENT: 2 MMHG
ECHO AV MEAN VELOCITY: 0.7 M/S
ECHO AV PEAK VELOCITY: 1 M/S
ECHO AV VTI: 16.5 CM
ECHO BSA: 2.14 M2
ECHO EST RA PRESSURE: 3 MMHG
ECHO LA DIAMETER INDEX: 2 CM/M2
ECHO LA DIAMETER: 4.2 CM
ECHO LA MAJOR AXIS: 6.4 CM
ECHO LA TO AORTIC ROOT RATIO: 1.24
ECHO LA VOL BP: 89 ML (ref 18–58)
ECHO LA VOL MOD A2C: 78 ML (ref 18–58)
ECHO LA VOL MOD A4C: 94 ML (ref 18–58)
ECHO LA VOL/BSA BIPLANE: 42 ML/M2 (ref 16–34)
ECHO LA VOLUME AREA LENGTH: 94 ML
ECHO LA VOLUME INDEX AREA LENGTH: 45 ML/M2 (ref 16–34)
ECHO LA VOLUME INDEX MOD A2C: 37 ML/M2 (ref 16–34)
ECHO LA VOLUME INDEX MOD A4C: 45 ML/M2 (ref 16–34)
ECHO LV E' LATERAL VELOCITY: 11 CM/S
ECHO LV EDV A2C: 66 ML
ECHO LV EDV A4C: 57 ML
ECHO LV EDV BP: 64 ML (ref 67–155)
ECHO LV EDV INDEX A4C: 27 ML/M2
ECHO LV EDV INDEX BP: 30 ML/M2
ECHO LV EDV NDEX A2C: 31 ML/M2
ECHO LV EJECTION FRACTION BIPLANE: 59 % (ref 55–100)
ECHO LV ESV A2C: 27 ML
ECHO LV ESV A4C: 23 ML
ECHO LV ESV BP: 26 ML (ref 22–58)
ECHO LV ESV INDEX A2C: 13 ML/M2
ECHO LV ESV INDEX A4C: 11 ML/M2
ECHO LV ESV INDEX BP: 12 ML/M2
ECHO LV FRACTIONAL SHORTENING: 29 % (ref 28–44)
ECHO LV INTERNAL DIMENSION DIASTOLE INDEX: 1.95 CM/M2
ECHO LV INTERNAL DIMENSION DIASTOLIC: 4.1 CM (ref 4.2–5.9)
ECHO LV INTERNAL DIMENSION SYSTOLIC INDEX: 1.38 CM/M2
ECHO LV INTERNAL DIMENSION SYSTOLIC: 2.9 CM
ECHO LV IVSD: 1.4 CM (ref 0.6–1)
ECHO LV IVSS: 1.6 CM
ECHO LV MASS 2D: 204.9 G (ref 88–224)
ECHO LV MASS INDEX 2D: 97.6 G/M2 (ref 49–115)
ECHO LV POSTERIOR WALL DIASTOLIC: 1.3 CM (ref 0.6–1)
ECHO LV POSTERIOR WALL SYSTOLIC: 1.5 CM
ECHO LV RELATIVE WALL THICKNESS RATIO: 0.63
ECHO LVOT AV VTI INDEX: 0.84
ECHO LVOT MEAN GRADIENT: 1 MMHG
ECHO LVOT VTI: 13.9 CM
ECHO MV E DECELERATION TIME (DT): 207.2 MS
ECHO MV E VELOCITY: 0.98 M/S
ECHO MV E/E' LATERAL: 8.91
ECHO PV MAX VELOCITY: 0.8 M/S
ECHO RIGHT VENTRICULAR SYSTOLIC PRESSURE (RVSP): 26 MMHG
ECHO RV INTERNAL DIMENSION: 3.3 CM
ECHO TV REGURGITANT MAX VELOCITY: 2.42 M/S

## 2023-12-02 PROCEDURE — 93306 TTE W/DOPPLER COMPLETE: CPT | Performed by: INTERNAL MEDICINE

## 2023-12-04 ENCOUNTER — TELEPHONE (OUTPATIENT)
Dept: CARDIOLOGY | Age: 68
End: 2023-12-04

## 2023-12-04 NOTE — TELEPHONE ENCOUNTER
----- Message from Ludmila Sifuentes MD sent at 12/3/2023 11:23 PM EST -----  Let Mr. Hansen know their test result was ok. Will discuss at next visit. Thanks.

## 2024-01-02 DIAGNOSIS — I42.9 IDIOPATHIC CARDIOMYOPATHY (HCC): ICD-10-CM

## 2024-01-02 DIAGNOSIS — I48.20 CHRONIC ATRIAL FIBRILLATION (HCC): ICD-10-CM

## 2024-01-02 DIAGNOSIS — I10 ESSENTIAL HYPERTENSION: ICD-10-CM

## 2024-01-02 DIAGNOSIS — I48.20 CHRONIC A-FIB (HCC): ICD-10-CM

## 2024-01-02 DIAGNOSIS — E78.2 MIXED HYPERLIPIDEMIA: ICD-10-CM

## 2024-01-02 DIAGNOSIS — I50.42 CHRONIC COMBINED SYSTOLIC AND DIASTOLIC CHF, NYHA CLASS 2 (HCC): ICD-10-CM

## 2024-01-02 RX ORDER — ATORVASTATIN CALCIUM 40 MG/1
40 TABLET, FILM COATED ORAL DAILY
Qty: 90 TABLET | Refills: 3 | Status: SHIPPED | OUTPATIENT
Start: 2024-01-02

## 2024-01-12 ENCOUNTER — OFFICE VISIT (OUTPATIENT)
Dept: PRIMARY CARE CLINIC | Age: 69
End: 2024-01-12
Payer: MEDICARE

## 2024-01-12 VITALS
OXYGEN SATURATION: 97 % | HEIGHT: 69 IN | WEIGHT: 197 LBS | DIASTOLIC BLOOD PRESSURE: 48 MMHG | SYSTOLIC BLOOD PRESSURE: 96 MMHG | HEART RATE: 77 BPM | BODY MASS INDEX: 29.18 KG/M2

## 2024-01-12 DIAGNOSIS — K52.9 CHRONIC DIARRHEA: Primary | ICD-10-CM

## 2024-01-12 PROCEDURE — 99214 OFFICE O/P EST MOD 30 MIN: CPT | Performed by: STUDENT IN AN ORGANIZED HEALTH CARE EDUCATION/TRAINING PROGRAM

## 2024-01-12 PROCEDURE — 1123F ACP DISCUSS/DSCN MKR DOCD: CPT | Performed by: STUDENT IN AN ORGANIZED HEALTH CARE EDUCATION/TRAINING PROGRAM

## 2024-01-12 PROCEDURE — 3074F SYST BP LT 130 MM HG: CPT | Performed by: STUDENT IN AN ORGANIZED HEALTH CARE EDUCATION/TRAINING PROGRAM

## 2024-01-12 PROCEDURE — 3078F DIAST BP <80 MM HG: CPT | Performed by: STUDENT IN AN ORGANIZED HEALTH CARE EDUCATION/TRAINING PROGRAM

## 2024-01-12 RX ORDER — LOPERAMIDE HYDROCHLORIDE 2 MG/1
2 CAPSULE ORAL 4 TIMES DAILY PRN
Qty: 40 CAPSULE | Refills: 0 | Status: SHIPPED | OUTPATIENT
Start: 2024-01-12 | End: 2024-01-22

## 2024-01-12 RX ORDER — DIPHENOXYLATE HYDROCHLORIDE AND ATROPINE SULFATE 2.5; .025 MG/1; MG/1
1 TABLET ORAL 4 TIMES DAILY PRN
Qty: 12 TABLET | Refills: 0 | Status: SHIPPED | OUTPATIENT
Start: 2024-01-12 | End: 2024-01-15

## 2024-01-12 ASSESSMENT — PATIENT HEALTH QUESTIONNAIRE - PHQ9
DEPRESSION UNABLE TO ASSESS: PT REFUSES
SUM OF ALL RESPONSES TO PHQ9 QUESTIONS 1 & 2: 0
1. LITTLE INTEREST OR PLEASURE IN DOING THINGS: 0
SUM OF ALL RESPONSES TO PHQ QUESTIONS 1-9: 0
2. FEELING DOWN, DEPRESSED OR HOPELESS: 0

## 2024-01-12 NOTE — PROGRESS NOTES
oriented to person, place, and time.   Skin: Skin is warm and dry. Patient is not diaphoretic.   Psychiatric: Patient's speech is normal and behavior is normal. Thought content normal.   Vitals reviewed.      Lab Results   Component Value Date    WBC 6.0 05/15/2023    HGB 14.6 05/15/2023    HCT 44.3 05/15/2023     05/15/2023    CHOL 119 05/15/2023    TRIG 110 05/15/2023    HDL 31 (L) 05/15/2023    ALT 14 05/15/2023    AST 14 05/15/2023     05/15/2023    K 4.7 05/15/2023     05/15/2023    CREATININE 0.91 05/15/2023    BUN 15 05/15/2023    CO2 33 (H) 05/15/2023    TSH 1.12 04/24/2015    PSA 1.41 05/15/2023    INR 1.2 01/10/2013    GLUF 115 (H) 06/15/2023    LABA1C 5.3 06/19/2023     Lab Results   Component Value Date    CALCIUM 9.4 05/15/2023     Lab Results   Component Value Date    LDLCHOLESTEROL 66 05/15/2023       Please note that this chart was generated using voice recognition Dragon dictation software. Although every effort was made to ensure the accuracy of this automated transcription, some errors in transcription may have occurred.    Electronically signed by Dr. Chi Rhodes MD on 1/12/2024 at 4:56 PM

## 2024-01-15 ENCOUNTER — HOSPITAL ENCOUNTER (OUTPATIENT)
Age: 69
Setting detail: SPECIMEN
Discharge: HOME | End: 2024-01-15
Payer: MEDICARE

## 2024-01-15 DIAGNOSIS — K52.9 CHRONIC DIARRHEA: ICD-10-CM

## 2024-01-15 LAB
C DIFF GDH + TOXINS A+B STL QL IA.RAPID: ABNORMAL
SPECIMEN DESCRIPTION: ABNORMAL

## 2024-01-15 PROCEDURE — 87015 SPECIMEN INFECT AGNT CONCNTJ: CPT

## 2024-01-15 PROCEDURE — 87506 IADNA-DNA/RNA PROBE TQ 6-11: CPT

## 2024-01-15 PROCEDURE — 87324 CLOSTRIDIUM AG IA: CPT

## 2024-01-15 PROCEDURE — 87210 SMEAR WET MOUNT SALINE/INK: CPT

## 2024-01-15 PROCEDURE — 87209 SMEAR COMPLEX STAIN: CPT

## 2024-01-15 PROCEDURE — 87449 NOS EACH ORGANISM AG IA: CPT

## 2024-01-16 ENCOUNTER — OFFICE VISIT (OUTPATIENT)
Dept: PRIMARY CARE CLINIC | Age: 69
End: 2024-01-16
Payer: MEDICARE

## 2024-01-16 ENCOUNTER — HOSPITAL ENCOUNTER (OUTPATIENT)
Age: 69
Discharge: HOME | End: 2024-01-16
Payer: MEDICARE

## 2024-01-16 VITALS
HEIGHT: 69 IN | HEART RATE: 91 BPM | OXYGEN SATURATION: 98 % | DIASTOLIC BLOOD PRESSURE: 58 MMHG | BODY MASS INDEX: 29.47 KG/M2 | WEIGHT: 199 LBS | SYSTOLIC BLOOD PRESSURE: 98 MMHG

## 2024-01-16 DIAGNOSIS — A04.72 C. DIFFICILE DIARRHEA: ICD-10-CM

## 2024-01-16 DIAGNOSIS — A04.72 C. DIFFICILE DIARRHEA: Primary | ICD-10-CM

## 2024-01-16 DIAGNOSIS — A04.72 C. DIFFICILE COLITIS: Primary | ICD-10-CM

## 2024-01-16 DIAGNOSIS — E83.51 HYPOCALCEMIA: ICD-10-CM

## 2024-01-16 LAB
ANION GAP SERPL CALCULATED.3IONS-SCNC: 8 MMOL/L (ref 9–17)
BASOPHILS # BLD: 0.04 K/UL (ref 0–0.2)
BASOPHILS NFR BLD: 1 % (ref 0–2)
BUN SERPL-MCNC: 13 MG/DL (ref 8–23)
BUN/CREAT SERPL: 14 (ref 9–20)
CALCIUM SERPL-MCNC: 8.2 MG/DL (ref 8.6–10.4)
CAMPYLOBACTER DNA SPEC NAA+PROBE: NORMAL
CHLORIDE SERPL-SCNC: 102 MMOL/L (ref 98–107)
CO2 SERPL-SCNC: 30 MMOL/L (ref 20–31)
CREAT SERPL-MCNC: 0.9 MG/DL (ref 0.7–1.2)
EOSINOPHIL # BLD: 0.2 K/UL (ref 0–0.44)
EOSINOPHILS RELATIVE PERCENT: 3 % (ref 1–4)
ERYTHROCYTE [DISTWIDTH] IN BLOOD BY AUTOMATED COUNT: 12.5 % (ref 11.8–14.4)
ETEC ELTA+ESTB GENES STL QL NAA+PROBE: NORMAL
GFR SERPL CREATININE-BSD FRML MDRD: >60 ML/MIN/1.73M2
GLUCOSE SERPL-MCNC: 104 MG/DL (ref 70–99)
HCT VFR BLD AUTO: 40.4 % (ref 40.7–50.3)
HGB BLD-MCNC: 13 G/DL (ref 13–17)
IMM GRANULOCYTES # BLD AUTO: <0.03 K/UL (ref 0–0.3)
IMM GRANULOCYTES NFR BLD: 0 %
LYMPHOCYTES NFR BLD: 1.88 K/UL (ref 1.1–3.7)
LYMPHOCYTES RELATIVE PERCENT: 27 % (ref 24–43)
MCH RBC QN AUTO: 28.1 PG (ref 25.2–33.5)
MCHC RBC AUTO-ENTMCNC: 32.2 G/DL (ref 28.4–34.8)
MCV RBC AUTO: 87.4 FL (ref 82.6–102.9)
MONOCYTES NFR BLD: 0.72 K/UL (ref 0.1–1.2)
MONOCYTES NFR BLD: 10 % (ref 3–12)
NEUTROPHILS NFR BLD: 59 % (ref 36–65)
NEUTS SEG NFR BLD: 4.2 K/UL (ref 1.5–8.1)
NRBC BLD-RTO: 0 PER 100 WBC
P SHIGELLOIDES DNA STL QL NAA+PROBE: NORMAL
PLATELET # BLD AUTO: 265 K/UL (ref 138–453)
PMV BLD AUTO: 10.8 FL (ref 8.1–13.5)
POTASSIUM SERPL-SCNC: 4.7 MMOL/L (ref 3.7–5.3)
RBC # BLD AUTO: 4.62 M/UL (ref 4.21–5.77)
SALMONELLA DNA SPEC QL NAA+PROBE: NORMAL
SHIGA TOXIN STX GENE SPEC NAA+PROBE: NORMAL
SHIGELLA DNA SPEC QL NAA+PROBE: NORMAL
SODIUM SERPL-SCNC: 140 MMOL/L (ref 135–144)
SPECIMEN DESCRIPTION: NORMAL
V CHOL+PARA RFBL+TRKH+TNAA STL QL NAA+PR: NORMAL
WBC OTHER # BLD: 7.1 K/UL (ref 3.5–11.3)
Y ENTERO RECN STL QL NAA+PROBE: NORMAL

## 2024-01-16 PROCEDURE — 85025 COMPLETE CBC W/AUTO DIFF WBC: CPT

## 2024-01-16 PROCEDURE — 3074F SYST BP LT 130 MM HG: CPT | Performed by: FAMILY MEDICINE

## 2024-01-16 PROCEDURE — 80048 BASIC METABOLIC PNL TOTAL CA: CPT

## 2024-01-16 PROCEDURE — 1123F ACP DISCUSS/DSCN MKR DOCD: CPT | Performed by: FAMILY MEDICINE

## 2024-01-16 PROCEDURE — 3078F DIAST BP <80 MM HG: CPT | Performed by: FAMILY MEDICINE

## 2024-01-16 PROCEDURE — 99214 OFFICE O/P EST MOD 30 MIN: CPT | Performed by: FAMILY MEDICINE

## 2024-01-16 PROCEDURE — 36415 COLL VENOUS BLD VENIPUNCTURE: CPT

## 2024-01-16 RX ORDER — VANCOMYCIN HYDROCHLORIDE 125 MG/1
125 CAPSULE ORAL 4 TIMES DAILY
Qty: 40 CAPSULE | Refills: 0 | Status: SHIPPED | OUTPATIENT
Start: 2024-01-16 | End: 2024-01-16

## 2024-01-16 NOTE — PROGRESS NOTES
[facility]      Patient:  Juan José Hansen 69 y.o. male     Date of Service: 1/16/24      Chief Complaint:   Chief Complaint   Patient presents with    Other     Positive C-Diff         History of Present Illness     Patient in office for fu on recent c dif positive testing. Patient had concerns ongoing for diarrhea for several days.  He had been treated for approximately 20 days with amoxicillin and then clindamycin.    C. difficile positive came back about 24 hours ago.  Currently the patient feels at his baseline with some malaise.  No current fever, chills.  He has been able to tolerate p.o. intake of food and fluid.  He has had several bowel movements over the past several hours, small-volume, liquidy with foul odor.  He does not report any significant/severe abdominal pain at this time.  No black or tarry stools, no bright red blood per rectum.        Allergies:    Bee venom    Medication List:    Current Outpatient Medications   Medication Sig Dispense Refill    Fidaxomicin (DIFICID) 200 MG TABS tablet Take 200 mg by mouth 2 times daily 20 tablet 0    loperamide (RA ANTI-DIARRHEAL) 2 MG capsule Take 1 capsule by mouth 4 times daily as needed for Diarrhea 40 capsule 0    atorvastatin (LIPITOR) 40 MG tablet Take 1 tablet by mouth daily 90 tablet 3    amLODIPine (NORVASC) 5 MG tablet Take 1 tablet by mouth daily 90 tablet 3    apixaban (ELIQUIS) 5 MG TABS tablet Take 1 tablet by mouth 2 times daily 180 tablet 0    sacubitril-valsartan (ENTRESTO) 49-51 MG per tablet Take 1 tablet by mouth 2 times daily 180 tablet 3    carvedilol (COREG) 25 MG tablet Take 1 tablet by mouth 2 times daily 180 tablet 0     No current facility-administered medications for this visit.          Review of Systems   See hpi  Physical Exam   Physical Exam  Constitutional:       Appearance: Well-developed.   HENT:      Head: Normocephalic.      Right Ear: External ear normal.      Left Ear: External ear normal.

## 2024-01-17 LAB
O+P STL CONC: NORMAL
SPECIMEN DESCRIPTION: NORMAL

## 2024-01-22 ENCOUNTER — HOSPITAL ENCOUNTER (OUTPATIENT)
Age: 69
Discharge: HOME | End: 2024-01-22
Payer: MEDICARE

## 2024-01-22 DIAGNOSIS — E83.51 HYPOCALCEMIA: ICD-10-CM

## 2024-01-22 DIAGNOSIS — A04.72 C. DIFFICILE DIARRHEA: ICD-10-CM

## 2024-01-22 LAB
ANION GAP SERPL CALCULATED.3IONS-SCNC: 6 MMOL/L (ref 9–17)
BUN SERPL-MCNC: 7 MG/DL (ref 8–23)
BUN/CREAT SERPL: 9 (ref 9–20)
CALCIUM SERPL-MCNC: 8.7 MG/DL (ref 8.6–10.4)
CHLORIDE SERPL-SCNC: 104 MMOL/L (ref 98–107)
CO2 SERPL-SCNC: 31 MMOL/L (ref 20–31)
CREAT SERPL-MCNC: 0.8 MG/DL (ref 0.7–1.2)
GFR SERPL CREATININE-BSD FRML MDRD: >60 ML/MIN/1.73M2
GLUCOSE SERPL-MCNC: 91 MG/DL (ref 70–99)
POTASSIUM SERPL-SCNC: 4.6 MMOL/L (ref 3.7–5.3)
SODIUM SERPL-SCNC: 141 MMOL/L (ref 135–144)

## 2024-01-22 PROCEDURE — 80048 BASIC METABOLIC PNL TOTAL CA: CPT

## 2024-01-22 PROCEDURE — 36415 COLL VENOUS BLD VENIPUNCTURE: CPT

## 2024-01-29 RX ORDER — CARVEDILOL 25 MG/1
25 TABLET ORAL 2 TIMES DAILY
Qty: 180 TABLET | Refills: 0 | Status: SHIPPED | OUTPATIENT
Start: 2024-01-29

## 2024-02-05 DIAGNOSIS — I48.20 CHRONIC ATRIAL FIBRILLATION (HCC): ICD-10-CM

## 2024-02-22 ENCOUNTER — OFFICE VISIT (OUTPATIENT)
Dept: PRIMARY CARE CLINIC | Age: 69
End: 2024-02-22
Payer: MEDICARE

## 2024-02-22 VITALS
HEART RATE: 62 BPM | SYSTOLIC BLOOD PRESSURE: 104 MMHG | RESPIRATION RATE: 16 BRPM | DIASTOLIC BLOOD PRESSURE: 62 MMHG | WEIGHT: 201.6 LBS | HEIGHT: 69 IN | BODY MASS INDEX: 29.86 KG/M2

## 2024-02-22 DIAGNOSIS — E78.2 MIXED HYPERLIPIDEMIA: ICD-10-CM

## 2024-02-22 DIAGNOSIS — I50.42 CHRONIC COMBINED SYSTOLIC AND DIASTOLIC CHF, NYHA CLASS 2 (HCC): ICD-10-CM

## 2024-02-22 DIAGNOSIS — I48.91 ATRIAL FIBRILLATION, UNSPECIFIED TYPE (HCC): ICD-10-CM

## 2024-02-22 DIAGNOSIS — I42.9 IDIOPATHIC CARDIOMYOPATHY (HCC): ICD-10-CM

## 2024-02-22 DIAGNOSIS — I10 ESSENTIAL HYPERTENSION: Primary | ICD-10-CM

## 2024-02-22 PROCEDURE — 99214 OFFICE O/P EST MOD 30 MIN: CPT | Performed by: STUDENT IN AN ORGANIZED HEALTH CARE EDUCATION/TRAINING PROGRAM

## 2024-02-22 PROCEDURE — 3074F SYST BP LT 130 MM HG: CPT | Performed by: STUDENT IN AN ORGANIZED HEALTH CARE EDUCATION/TRAINING PROGRAM

## 2024-02-22 PROCEDURE — 1123F ACP DISCUSS/DSCN MKR DOCD: CPT | Performed by: STUDENT IN AN ORGANIZED HEALTH CARE EDUCATION/TRAINING PROGRAM

## 2024-02-22 PROCEDURE — 3078F DIAST BP <80 MM HG: CPT | Performed by: STUDENT IN AN ORGANIZED HEALTH CARE EDUCATION/TRAINING PROGRAM

## 2024-02-22 RX ORDER — VANCOMYCIN HYDROCHLORIDE 125 MG/1
CAPSULE ORAL
COMMUNITY
Start: 2024-01-16 | End: 2024-02-22

## 2024-02-22 SDOH — ECONOMIC STABILITY: FOOD INSECURITY: WITHIN THE PAST 12 MONTHS, THE FOOD YOU BOUGHT JUST DIDN'T LAST AND YOU DIDN'T HAVE MONEY TO GET MORE.: NEVER TRUE

## 2024-02-22 SDOH — ECONOMIC STABILITY: INCOME INSECURITY: HOW HARD IS IT FOR YOU TO PAY FOR THE VERY BASICS LIKE FOOD, HOUSING, MEDICAL CARE, AND HEATING?: NOT VERY HARD

## 2024-02-22 SDOH — ECONOMIC STABILITY: FOOD INSECURITY: WITHIN THE PAST 12 MONTHS, YOU WORRIED THAT YOUR FOOD WOULD RUN OUT BEFORE YOU GOT MONEY TO BUY MORE.: NEVER TRUE

## 2024-02-22 NOTE — PROGRESS NOTES
[facility] PRIMARY CARE  [address] [place] [address] , [address]  [place], [place], [address]    Juan José Hansen is a 69 y.o. male with  has a past medical history of Atrial fibrillation (HCC), Cardiomyopathy (HCC), Congestive heart failure, unspecified, History of echocardiogram, and Hyperlipidemia.  Presented to the office today for:  Chief Complaint   Patient presents with    Hypertension    Hyperlipidemia       Assessment/Plan   1. Essential hypertension [I10]  2. Chronic combined systolic and diastolic CHF, NYHA class 2 (HCC)  3. Idiopathic cardiomyopathy (HCC)  4. Atrial fibrillation, unspecified type (HCC)  5. Mixed hyperlipidemia  Return in about 3 months (around 5/22/2024) for F/U Meds/HTN.    Hypertension/CHF/Idiopathic cardiomyopathy/Afib - Continue w/ entresto 49-51mg, coreg 25mg BID, Eliquis 5mg BID, amlodipine at 5mg  Hyperlipidemia - Continue w/ lipitor at 40mg PO daily.     All patient questions answered.  Pt voiced understanding.   Medications Discontinued During This Encounter   Medication Reason    vancomycin (VANCOCIN) 125 MG capsule LIST CLEANUP    Fidaxomicin (DIFICID) 200 MG TABS tablet LIST CLEANUP       Patient received counseling on the following healthy behaviors: nutrition, exercise and medication adherence. I encouraged and discussed lifestyle modifications including diet and exercise (150+ minutes of moderate-high intensity) and the patient was agreeable to making positive/beneficial changes to both to help improve their overall health. Discussed use, benefit, and side effects of prescribed medications.  Barriers to medication compliance addressed. Patient given educational materials: see patient instructions.     HM - HM items completed today as per orders. Outstanding HM items though not limited to immunizations were discussed with the patient today, including risks, benefits and alternatives. The patient will discuss these during the next appointment per their preference. If there are

## 2024-04-25 RX ORDER — CARVEDILOL 25 MG/1
25 TABLET ORAL 2 TIMES DAILY
Qty: 180 TABLET | Refills: 0 | Status: SHIPPED | OUTPATIENT
Start: 2024-04-25

## 2024-05-24 ENCOUNTER — OFFICE VISIT (OUTPATIENT)
Dept: PRIMARY CARE CLINIC | Age: 69
End: 2024-05-24

## 2024-05-24 VITALS
BODY MASS INDEX: 30.36 KG/M2 | OXYGEN SATURATION: 98 % | HEART RATE: 87 BPM | SYSTOLIC BLOOD PRESSURE: 112 MMHG | DIASTOLIC BLOOD PRESSURE: 64 MMHG | WEIGHT: 205 LBS | HEIGHT: 69 IN

## 2024-05-24 DIAGNOSIS — R73.9 HYPERGLYCEMIA: ICD-10-CM

## 2024-05-24 DIAGNOSIS — I48.91 ATRIAL FIBRILLATION, UNSPECIFIED TYPE (HCC): ICD-10-CM

## 2024-05-24 DIAGNOSIS — I10 ESSENTIAL HYPERTENSION: Primary | ICD-10-CM

## 2024-05-24 DIAGNOSIS — E78.2 MIXED HYPERLIPIDEMIA: ICD-10-CM

## 2024-05-24 NOTE — PROGRESS NOTES
their preference. If there are any worsening or concerning signs or symptoms, patient will report to the ED and/or contact [id number] for immediate evaluation. Teach back method was used.     Subjective:    HPI  Chief Complaint   Patient presents with    Hyperlipidemia    Hypertension     Hypertension/CHF/Idiopathic cardiomyopathy/Afib:  Home blood pressure monitoring: Yes - every so often.  He is adherent to a low sodium diet. Patient denies blurred vision and peripheral edema.  Antihypertensive medication side effects: no medication side effects noted.  Use of agents associated with hypertension: none.  Current medication includes: Amlodipine 5mg and Coreg 25mg. He is on Eliquis at 5mg BID                                      Hyperlipidemia  Medication compliance: compliant most of the time. Patient is  following a low fat, low cholesterol diet.  He is not exercising regularly.Medication includes atorvastatin 40mg    Health Maintenance -   Alcohol/Substance use History - None/Minimal    Tobacco Use      Smoking status: Former        Packs/day: 0.00        Years: 0.5 packs/day for 20.0 years (10.0 ttl pk-yrs)        Types: Cigarettes        Start date: 1/10/1973        Quit date: 1/10/1993        Years since quittin.3      Smokeless tobacco: Never    Family History   Problem Relation Age of Onset    High Blood Pressure Mother     Heart Disease Mother     Stroke Mother     High Blood Pressure Sister     Other Brother         valve replacement in heart            2024     4:30 PM 2023     1:59 PM 2/10/2023     1:18 PM 5/3/2022     1:33 PM 2021     1:53 PM 2021     2:07 PM 2020     1:16 PM   PHQ Scores   PHQ2 Score 0 0 0 0 1 0 0   PHQ9 Score 0 0 0 0 1 0 0     Interpretation of Total Score Depression Severity: 1-4 = Minimal depression, 5-9 = Mild depression, 10-14 = Moderate depression, 15-19 = Moderately severe depression, 20-27 = Severe depression    Review of Systems  Constitutional:

## 2024-07-25 RX ORDER — CARVEDILOL 25 MG/1
25 TABLET ORAL 2 TIMES DAILY
Qty: 180 TABLET | Refills: 0 | Status: SHIPPED | OUTPATIENT
Start: 2024-07-25

## 2024-10-07 ENCOUNTER — HOSPITAL ENCOUNTER (OUTPATIENT)
Age: 69
Discharge: HOME | End: 2024-10-07

## 2024-10-28 RX ORDER — AMLODIPINE BESYLATE 5 MG/1
5 TABLET ORAL DAILY
Qty: 90 TABLET | Refills: 3 | Status: SHIPPED | OUTPATIENT
Start: 2024-10-28 | End: 2024-10-29

## 2024-10-28 RX ORDER — CARVEDILOL 25 MG/1
25 TABLET ORAL 2 TIMES DAILY
Qty: 180 TABLET | Refills: 0 | Status: SHIPPED | OUTPATIENT
Start: 2024-10-28

## 2024-10-28 NOTE — TELEPHONE ENCOUNTER
Patient called in for a refill on his Amlodipine and Carvedilol. Orders are pended and pharmacy is verified.

## 2024-10-29 RX ORDER — AMLODIPINE BESYLATE 5 MG/1
5 TABLET ORAL DAILY
Qty: 90 TABLET | Refills: 3 | Status: SHIPPED | OUTPATIENT
Start: 2024-10-29

## 2024-10-29 RX ORDER — CARVEDILOL 25 MG/1
25 TABLET ORAL 2 TIMES DAILY
Qty: 180 TABLET | Refills: 0 | Status: SHIPPED | OUTPATIENT
Start: 2024-10-29 | End: 2024-10-30 | Stop reason: SDUPTHER

## 2024-10-30 ENCOUNTER — OFFICE VISIT (OUTPATIENT)
Dept: CARDIOLOGY | Age: 69
End: 2024-10-30

## 2024-10-30 VITALS
DIASTOLIC BLOOD PRESSURE: 73 MMHG | HEART RATE: 78 BPM | RESPIRATION RATE: 18 BRPM | BODY MASS INDEX: 30.36 KG/M2 | OXYGEN SATURATION: 99 % | SYSTOLIC BLOOD PRESSURE: 107 MMHG | WEIGHT: 205 LBS | HEIGHT: 69 IN

## 2024-10-30 DIAGNOSIS — E78.2 MIXED HYPERLIPIDEMIA: ICD-10-CM

## 2024-10-30 DIAGNOSIS — I50.42 CHRONIC COMBINED SYSTOLIC AND DIASTOLIC CHF, NYHA CLASS 2 (HCC): Primary | ICD-10-CM

## 2024-10-30 DIAGNOSIS — Z79.01 ENCOUNTER FOR CURRENT LONG-TERM USE OF ANTICOAGULANTS: ICD-10-CM

## 2024-10-30 DIAGNOSIS — I48.20 CHRONIC ATRIAL FIBRILLATION (HCC): ICD-10-CM

## 2024-10-30 DIAGNOSIS — I10 ESSENTIAL HYPERTENSION: ICD-10-CM

## 2024-10-30 DIAGNOSIS — I42.9 IDIOPATHIC CARDIOMYOPATHY (HCC): ICD-10-CM

## 2024-10-30 RX ORDER — SACUBITRIL AND VALSARTAN 49; 51 MG/1; MG/1
1 TABLET, FILM COATED ORAL 2 TIMES DAILY
Qty: 180 TABLET | Refills: 3 | Status: SHIPPED | OUTPATIENT
Start: 2024-10-30

## 2024-10-30 NOTE — PROGRESS NOTES
I, Graciela Phelps am scribing for and in the presence of Abilio Wong MD, MS, F.A.C.C.    Patient: Juan José Hansen  : 1955  Date of Visit: 2024    REASON FOR VISIT / CONSULTATION: Hypertension (HX:SOB, PAF, jena, HTN, idiopathic cardiomyopathy pt is here for yearly follow up Denies:CP,sob, lighthearted/dizziness,palp )    Dear Chi Rhodes MD,     I had the pleasure of seeing your patient Juan José Hansen in consultation today. As you know, Mr. Hansen is a 69 y.o. male with a history of what sounds to be a non-ischemic cardiomyopathy back in  most likely secondary to a rate related cardiomyopathy due to atrial fibrillation. At that same time, he says he thinks he was diagnosed with blood clots in his lungs but denies any known issues or repeat problems since then. At that time his EF was apparently \"severely reduced\" but he is unsure of whether his heart ever got stronger or not. His EF in  was 35-40% on echocardiography. In , he was started on Entresto for his non ischemic cardiomyopathy. Echocardiogram done on 2018 showed an improved ejection fraction from 35-40% up to >55%. Echocardiogram done on 2021: EF of >55% Moderately increased LV wall thickness. Left atrium mildly dilated. Mild mitral regurgitation. Mild to moderate tricuspid regurgitation. Evidence of mild diastolic dysfunction is seen. EKG done in office 10/30/23: Atrial Fibrillation. Echo done on 2023 EF 55 to 60%  Normal left ventricular cavity size with moderately increased left ventricular wall thickness. No definite specific wall motion abnormalities were identified.  Mild mitral regurgitation. Mild to moderate tricuspid regurgitation. Dilated left atrium. Diastolic function is indeterminate due to atrial fibrillation.    Mr. Hansen is here for a yearly follow up. He states he is doing well since last visit. He denies any palpitations, light headed/dizziness or any shortness of breath. He denies

## 2024-11-11 ENCOUNTER — HOSPITAL ENCOUNTER (OUTPATIENT)
Age: 69
Discharge: HOME | End: 2024-11-11
Payer: MEDICARE

## 2024-11-11 DIAGNOSIS — I10 ESSENTIAL HYPERTENSION: ICD-10-CM

## 2024-11-11 DIAGNOSIS — R73.9 HYPERGLYCEMIA: ICD-10-CM

## 2024-11-11 DIAGNOSIS — E78.2 MIXED HYPERLIPIDEMIA: ICD-10-CM

## 2024-11-11 LAB
25(OH)D3 SERPL-MCNC: 22 NG/ML (ref 30–100)
ALBUMIN SERPL-MCNC: 4.3 G/DL (ref 3.5–5.2)
ALBUMIN/GLOB SERPL: 2.2 {RATIO} (ref 1–2.5)
ALP SERPL-CCNC: 91 U/L (ref 40–129)
ALT SERPL-CCNC: 12 U/L (ref 10–50)
ANION GAP SERPL CALCULATED.3IONS-SCNC: 9 MMOL/L (ref 9–16)
AST SERPL-CCNC: 16 U/L (ref 10–50)
BASOPHILS # BLD: 0.08 K/UL (ref 0–0.2)
BASOPHILS NFR BLD: 1 % (ref 0–2)
BILIRUB SERPL-MCNC: 0.9 MG/DL (ref 0–1.2)
BUN SERPL-MCNC: 14 MG/DL (ref 8–23)
BUN/CREAT SERPL: 13 (ref 9–20)
CALCIUM SERPL-MCNC: 9 MG/DL (ref 8.6–10.4)
CHLORIDE SERPL-SCNC: 103 MMOL/L (ref 98–107)
CHOLEST SERPL-MCNC: 129 MG/DL (ref 0–199)
CHOLESTEROL/HDL RATIO: 4
CO2 SERPL-SCNC: 29 MMOL/L (ref 20–31)
CREAT SERPL-MCNC: 1.1 MG/DL (ref 0.7–1.2)
EKG Q-T INTERVAL: 402 MS
EKG QRS DURATION: 74 MS
EKG QTC CALCULATION (BAZETT): 411 MS
EKG R AXIS: -27 DEGREES
EKG T AXIS: 5 DEGREES
EKG VENTRICULAR RATE: 63 BPM
EOSINOPHIL # BLD: 0.21 K/UL (ref 0–0.44)
EOSINOPHILS RELATIVE PERCENT: 3 % (ref 1–4)
ERYTHROCYTE [DISTWIDTH] IN BLOOD BY AUTOMATED COUNT: 13 % (ref 11.8–14.4)
EST. AVERAGE GLUCOSE BLD GHB EST-MCNC: 105 MG/DL
GFR, ESTIMATED: 76 ML/MIN/1.73M2
GLUCOSE SERPL-MCNC: 109 MG/DL (ref 74–99)
HBA1C MFR BLD: 5.3 % (ref 4–6)
HCT VFR BLD AUTO: 43.2 % (ref 40.7–50.3)
HDLC SERPL-MCNC: 32 MG/DL
HGB BLD-MCNC: 14.6 G/DL (ref 13–17)
IMM GRANULOCYTES # BLD AUTO: <0.03 K/UL (ref 0–0.3)
IMM GRANULOCYTES NFR BLD: 0 %
LDLC SERPL CALC-MCNC: 72 MG/DL (ref 0–100)
LYMPHOCYTES NFR BLD: 2.28 K/UL (ref 1.1–3.7)
LYMPHOCYTES RELATIVE PERCENT: 33 % (ref 24–43)
MCH RBC QN AUTO: 29.7 PG (ref 25.2–33.5)
MCHC RBC AUTO-ENTMCNC: 33.8 G/DL (ref 28.4–34.8)
MCV RBC AUTO: 87.8 FL (ref 82.6–102.9)
MONOCYTES NFR BLD: 0.57 K/UL (ref 0.1–1.2)
MONOCYTES NFR BLD: 8 % (ref 3–12)
NEUTROPHILS NFR BLD: 55 % (ref 36–65)
NEUTS SEG NFR BLD: 3.76 K/UL (ref 1.5–8.1)
NRBC BLD-RTO: 0 PER 100 WBC
PLATELET # BLD AUTO: 184 K/UL (ref 138–453)
PMV BLD AUTO: 11.4 FL (ref 8.1–13.5)
POTASSIUM SERPL-SCNC: 3.9 MMOL/L (ref 3.7–5.3)
PROT SERPL-MCNC: 6.2 G/DL (ref 6.6–8.7)
RBC # BLD AUTO: 4.92 M/UL (ref 4.21–5.77)
SODIUM SERPL-SCNC: 141 MMOL/L (ref 136–145)
TRIGL SERPL-MCNC: 126 MG/DL
VLDLC SERPL CALC-MCNC: 25 MG/DL (ref 1–30)
WBC OTHER # BLD: 6.9 K/UL (ref 3.5–11.3)

## 2024-11-11 PROCEDURE — 93005 ELECTROCARDIOGRAM TRACING: CPT

## 2024-11-11 PROCEDURE — 80061 LIPID PANEL: CPT

## 2024-11-11 PROCEDURE — 80053 COMPREHEN METABOLIC PANEL: CPT

## 2024-11-11 PROCEDURE — 83036 HEMOGLOBIN GLYCOSYLATED A1C: CPT

## 2024-11-11 PROCEDURE — 36415 COLL VENOUS BLD VENIPUNCTURE: CPT

## 2024-11-11 PROCEDURE — 82306 VITAMIN D 25 HYDROXY: CPT

## 2024-11-11 PROCEDURE — 85025 COMPLETE CBC W/AUTO DIFF WBC: CPT

## 2024-11-26 ENCOUNTER — OFFICE VISIT (OUTPATIENT)
Dept: PRIMARY CARE CLINIC | Age: 69
End: 2024-11-26

## 2024-11-26 VITALS
SYSTOLIC BLOOD PRESSURE: 118 MMHG | HEIGHT: 69 IN | OXYGEN SATURATION: 99 % | BODY MASS INDEX: 29.77 KG/M2 | HEART RATE: 91 BPM | WEIGHT: 201 LBS | DIASTOLIC BLOOD PRESSURE: 78 MMHG

## 2024-11-26 DIAGNOSIS — I10 ESSENTIAL HYPERTENSION: ICD-10-CM

## 2024-11-26 DIAGNOSIS — Z00.00 MEDICARE ANNUAL WELLNESS VISIT, SUBSEQUENT: Primary | ICD-10-CM

## 2024-11-26 DIAGNOSIS — I48.91 ATRIAL FIBRILLATION, UNSPECIFIED TYPE (HCC): ICD-10-CM

## 2024-11-26 DIAGNOSIS — E78.2 MIXED HYPERLIPIDEMIA: ICD-10-CM

## 2024-11-26 DIAGNOSIS — Z23 NEEDS FLU SHOT: ICD-10-CM

## 2024-11-26 ASSESSMENT — PATIENT HEALTH QUESTIONNAIRE - PHQ9
2. FEELING DOWN, DEPRESSED OR HOPELESS: NOT AT ALL
SUM OF ALL RESPONSES TO PHQ QUESTIONS 1-9: 0
SUM OF ALL RESPONSES TO PHQ9 QUESTIONS 1 & 2: 0
SUM OF ALL RESPONSES TO PHQ QUESTIONS 1-9: 0
1. LITTLE INTEREST OR PLEASURE IN DOING THINGS: NOT AT ALL

## 2024-11-26 ASSESSMENT — LIFESTYLE VARIABLES: HOW OFTEN DO YOU HAVE A DRINK CONTAINING ALCOHOL: NEVER

## 2024-11-26 NOTE — PROGRESS NOTES
[facility] PRIMARY CARE  [address] [place] [address] , [address]  [place], [place], [address]      Medicare Annual Wellness Visit / Progress Note    Juan José Hansen is here for Medicare AWV, Hyperlipidemia, Hypertension, and Congestive Heart Failure (/)    Assessment & Plan   Medicare annual wellness visit, subsequent  Needs flu shot  -     Influenza, FLUAD Trivalent, (age 65 y+), IM, Preservative Free, 0.5mL  Essential hypertension [I10]  Mixed hyperlipidemia  Atrial fibrillation, unspecified type (HCC)    Recommendations for Preventive Services Due: see orders and patient instructions/AVS.  Recommended screening schedule for the next 5-10 years is provided to the patient in written form: see Patient Instructions/AVS.    Hypertension/CHF/Idiopathic cardiomyopathy/Afib - Continue w/ entresto 49-51mg, coreg 25mg BID, Eliquis 5mg BID, amlodipine at 5mg  Hyperlipidemia - Continue w/ lipitor at 40mg PO daily.       Return in 6 months (on 5/26/2025) for F/U Med Management.     Subjective   The following acute and/or chronic problems were also addressed today:    Hypertension/CHF/Idiopathic cardiomyopathy/Afib:  Home blood pressure monitoring: Yes - every so often.  He is adherent to a low sodium diet. Patient denies blurred vision and peripheral edema.  Antihypertensive medication side effects: no medication side effects noted.  Use of agents associated with hypertension: none.  Current medication includes: Amlodipine 5mg and Coreg 25mg. He is on Eliquis at 5mg BID                                      Hyperlipidemia  Medication compliance: compliant most of the time. Patient is  following a low fat, low cholesterol diet.  He is not exercising regularly.Medication includes atorvastatin 40 mg.    The patient is given the influenza vaccination in the office today.  See Immunization Record for details.       Patient's complete Health Risk Assessment and screening values have been reviewed and are found in Flowsheets. The

## 2024-11-26 NOTE — PATIENT INSTRUCTIONS
gets worse over time or can't be cured?  What are you most afraid of that might happen? (Maybe you're afraid of having pain, losing your independence, or being kept alive by machines.)  Where would you prefer to die? (Your home? A hospital? A nursing home?)  Do you want to donate your organs when you die?  Do you want certain Denominational practices performed before you die?  When should you call for help?  Be sure to contact your doctor if you have any questions.  Where can you learn more?  Go to https://[email or web address].net/patientEd and enter R264 to learn more about \"Advance Directives: Care Instructions.\"  Current as of: November 16, 2023  Content Version: 14.2  © 2024 [employer].   Care instructions adapted under license by [employer]. If you have questions about a medical condition or this instruction, always ask your healthcare professional. [employer] disclaims any warranty or liability for your use of this information.           A Healthy Heart: Care Instructions  Overview     Coronary artery disease, also called heart disease, occurs when a substance called plaque builds up in the vessels that supply oxygen-rich blood to your heart muscle. This can narrow the blood vessels and reduce blood flow. A heart attack happens when blood flow is completely blocked. A high-fat diet, smoking, and other factors increase the risk of heart disease.  Your doctor has found that you have a chance of having heart disease. A heart-healthy lifestyle can help keep your heart healthy and prevent heart disease. This lifestyle includes eating healthy, being active, staying at a weight that's healthy for you, and not smoking or using tobacco. It also includes taking medicines as directed, managing other health conditions, and trying to get a healthy amount of sleep.  Follow-up care is a key part of your treatment and safety. Be sure to make and go to all appointments, and call your doctor if you are having

## 2024-12-23 DIAGNOSIS — E78.2 MIXED HYPERLIPIDEMIA: ICD-10-CM

## 2024-12-23 DIAGNOSIS — I42.9 IDIOPATHIC CARDIOMYOPATHY (HCC): ICD-10-CM

## 2024-12-23 DIAGNOSIS — I48.20 CHRONIC ATRIAL FIBRILLATION (HCC): ICD-10-CM

## 2024-12-23 DIAGNOSIS — I48.20 CHRONIC A-FIB (HCC): ICD-10-CM

## 2024-12-23 DIAGNOSIS — I50.42 CHRONIC COMBINED SYSTOLIC AND DIASTOLIC CHF, NYHA CLASS 2 (HCC): ICD-10-CM

## 2024-12-23 DIAGNOSIS — I10 ESSENTIAL HYPERTENSION: ICD-10-CM

## 2024-12-23 RX ORDER — ATORVASTATIN CALCIUM 40 MG/1
40 TABLET, FILM COATED ORAL DAILY
Qty: 90 TABLET | Refills: 3 | Status: SHIPPED | OUTPATIENT
Start: 2024-12-23

## 2024-12-24 DIAGNOSIS — I48.20 CHRONIC ATRIAL FIBRILLATION (HCC): ICD-10-CM

## 2024-12-24 DIAGNOSIS — I50.42 CHRONIC COMBINED SYSTOLIC AND DIASTOLIC CHF, NYHA CLASS 2 (HCC): ICD-10-CM

## 2024-12-24 DIAGNOSIS — E78.2 MIXED HYPERLIPIDEMIA: ICD-10-CM

## 2024-12-24 DIAGNOSIS — I42.9 IDIOPATHIC CARDIOMYOPATHY (HCC): ICD-10-CM

## 2024-12-24 DIAGNOSIS — I10 ESSENTIAL HYPERTENSION: ICD-10-CM

## 2024-12-24 DIAGNOSIS — I48.20 CHRONIC A-FIB (HCC): ICD-10-CM

## 2024-12-26 PROBLEM — Z00.00 MEDICARE ANNUAL WELLNESS VISIT, SUBSEQUENT: Status: RESOLVED | Noted: 2024-11-26 | Resolved: 2024-12-26

## 2024-12-30 RX ORDER — ATORVASTATIN CALCIUM 40 MG/1
40 TABLET, FILM COATED ORAL DAILY
Qty: 90 TABLET | Refills: 3 | Status: SHIPPED | OUTPATIENT
Start: 2024-12-30

## 2025-01-22 RX ORDER — CARVEDILOL 25 MG/1
25 TABLET ORAL 2 TIMES DAILY
Qty: 180 TABLET | Refills: 0 | Status: SHIPPED | OUTPATIENT
Start: 2025-01-22

## 2025-01-22 NOTE — TELEPHONE ENCOUNTER
Dr. Rhodes pt  Patient called requesting refill on carvedilol. Order pended and pharmacy verified.

## 2025-02-02 DIAGNOSIS — I48.20 CHRONIC ATRIAL FIBRILLATION (HCC): ICD-10-CM

## 2025-02-03 RX ORDER — APIXABAN 5 MG/1
5 TABLET, FILM COATED ORAL 2 TIMES DAILY
Qty: 180 TABLET | Refills: 3 | Status: SHIPPED | OUTPATIENT
Start: 2025-02-03

## 2025-04-21 RX ORDER — CARVEDILOL 25 MG/1
25 TABLET ORAL 2 TIMES DAILY
Qty: 180 TABLET | Refills: 3 | Status: SHIPPED | OUTPATIENT
Start: 2025-04-21

## 2025-05-28 ENCOUNTER — OFFICE VISIT (OUTPATIENT)
Dept: PRIMARY CARE CLINIC | Age: 70
End: 2025-05-28
Payer: MEDICARE

## 2025-05-28 VITALS
DIASTOLIC BLOOD PRESSURE: 66 MMHG | HEIGHT: 69 IN | OXYGEN SATURATION: 98 % | SYSTOLIC BLOOD PRESSURE: 110 MMHG | WEIGHT: 208.6 LBS | RESPIRATION RATE: 16 BRPM | HEART RATE: 63 BPM | BODY MASS INDEX: 30.9 KG/M2

## 2025-05-28 DIAGNOSIS — E78.2 MIXED HYPERLIPIDEMIA: ICD-10-CM

## 2025-05-28 DIAGNOSIS — E55.9 HYPOVITAMINOSIS D: ICD-10-CM

## 2025-05-28 DIAGNOSIS — I10 ESSENTIAL HYPERTENSION: Primary | ICD-10-CM

## 2025-05-28 DIAGNOSIS — I48.91 ATRIAL FIBRILLATION, UNSPECIFIED TYPE (HCC): ICD-10-CM

## 2025-05-28 DIAGNOSIS — I50.42 CHRONIC COMBINED SYSTOLIC AND DIASTOLIC CHF, NYHA CLASS 2 (HCC): ICD-10-CM

## 2025-05-28 DIAGNOSIS — R73.9 HYPERGLYCEMIA: ICD-10-CM

## 2025-05-28 PROCEDURE — 1123F ACP DISCUSS/DSCN MKR DOCD: CPT | Performed by: STUDENT IN AN ORGANIZED HEALTH CARE EDUCATION/TRAINING PROGRAM

## 2025-05-28 PROCEDURE — 1159F MED LIST DOCD IN RCRD: CPT | Performed by: STUDENT IN AN ORGANIZED HEALTH CARE EDUCATION/TRAINING PROGRAM

## 2025-05-28 PROCEDURE — G2211 COMPLEX E/M VISIT ADD ON: HCPCS | Performed by: STUDENT IN AN ORGANIZED HEALTH CARE EDUCATION/TRAINING PROGRAM

## 2025-05-28 PROCEDURE — 99214 OFFICE O/P EST MOD 30 MIN: CPT | Performed by: STUDENT IN AN ORGANIZED HEALTH CARE EDUCATION/TRAINING PROGRAM

## 2025-05-28 PROCEDURE — 3074F SYST BP LT 130 MM HG: CPT | Performed by: STUDENT IN AN ORGANIZED HEALTH CARE EDUCATION/TRAINING PROGRAM

## 2025-05-28 PROCEDURE — 3078F DIAST BP <80 MM HG: CPT | Performed by: STUDENT IN AN ORGANIZED HEALTH CARE EDUCATION/TRAINING PROGRAM

## 2025-05-28 PROCEDURE — 1160F RVW MEDS BY RX/DR IN RCRD: CPT | Performed by: STUDENT IN AN ORGANIZED HEALTH CARE EDUCATION/TRAINING PROGRAM

## 2025-05-28 SDOH — ECONOMIC STABILITY: FOOD INSECURITY: WITHIN THE PAST 12 MONTHS, THE FOOD YOU BOUGHT JUST DIDN'T LAST AND YOU DIDN'T HAVE MONEY TO GET MORE.: NEVER TRUE

## 2025-05-28 SDOH — ECONOMIC STABILITY: FOOD INSECURITY: WITHIN THE PAST 12 MONTHS, YOU WORRIED THAT YOUR FOOD WOULD RUN OUT BEFORE YOU GOT MONEY TO BUY MORE.: NEVER TRUE

## 2025-05-28 ASSESSMENT — PATIENT HEALTH QUESTIONNAIRE - PHQ9
SUM OF ALL RESPONSES TO PHQ QUESTIONS 1-9: 0
2. FEELING DOWN, DEPRESSED OR HOPELESS: NOT AT ALL
1. LITTLE INTEREST OR PLEASURE IN DOING THINGS: NOT AT ALL
SUM OF ALL RESPONSES TO PHQ QUESTIONS 1-9: 0

## 2025-05-28 NOTE — PROGRESS NOTES
risks, benefits and alternatives. The patient will discuss these during the next appointment per their preference. If there are any worsening or concerning signs or symptoms, patient will report to the ED and/or contact [id number] for immediate evaluation. Teach back method was used.     Subjective:    HPI  Chief Complaint   Patient presents with    Hypertension    Hyperlipidemia     Hypertension/CHF/Afib:  Home blood pressure monitoring: No. Patient denies chest pain and shortness of breath.  Antihypertensive medication side effects: no medication side effects noted.  Use of agents associated with hypertension: none.  Cardiac symptoms none. Patient denies chest pain, chest pressure/discomfort, claudication, dyspnea, exertional chest pressure/discomfort, fatigue, irregular heart beat, lower extremity edema, near-syncope, orthopnea, palpitations, paroxysmal nocturnal dyspnea, syncope, and tachypnea.                                        Hyperlipidemia:  No new myalgias or GI upset on atorvastatin (Lipitor).     He does take vitamin D supplementation, given it had been low in the past.    History of Present Illness    Health Maintenance -   Alcohol/Substance use History - None/Minimal    Tobacco Use      Smoking status: Former        Packs/day: 0.00        Years: 0.5 packs/day for 20.0 years (10.0 ttl pk-yrs)        Types: Cigarettes        Start date: 1/10/1973        Quit date: 1/10/1993        Years since quittin.4      Smokeless tobacco: Never    Family History   Problem Relation Age of Onset    High Blood Pressure Mother     Heart Disease Mother     Stroke Mother     High Blood Pressure Sister     Other Brother         valve replacement in heart            2025     1:49 PM 2024     1:41 PM 2024     4:30 PM 2023     1:59 PM 2/10/2023     1:18 PM 5/3/2022     1:33 PM 2021     1:53 PM   PHQ Scores   PHQ2 Score 0 0 0 0 0 0 1   PHQ9 Score 0 0 0 0 0 0 1     Interpretation of Total Score

## (undated) DEVICE — MEDI-VAC NON-CONDUCTIVE TUBING7MM X 30.5 (100FT): Brand: CARDINAL HEALTH

## (undated) DEVICE — CANNULA ORAL NSL AD CO2 N INTUB O2 DEL DISP TRU LNK